# Patient Record
Sex: FEMALE | Race: WHITE
[De-identification: names, ages, dates, MRNs, and addresses within clinical notes are randomized per-mention and may not be internally consistent; named-entity substitution may affect disease eponyms.]

---

## 2017-12-19 ENCOUNTER — HOSPITAL ENCOUNTER (INPATIENT)
Dept: HOSPITAL 92 - ERS | Age: 82
LOS: 2 days | Discharge: HOME | DRG: 69 | End: 2017-12-21
Attending: INTERNAL MEDICINE | Admitting: INTERNAL MEDICINE
Payer: MEDICARE

## 2017-12-19 VITALS — BODY MASS INDEX: 24.6 KG/M2

## 2017-12-19 DIAGNOSIS — Z91.81: ICD-10-CM

## 2017-12-19 DIAGNOSIS — I65.23: ICD-10-CM

## 2017-12-19 DIAGNOSIS — G89.29: ICD-10-CM

## 2017-12-19 DIAGNOSIS — Z79.891: ICD-10-CM

## 2017-12-19 DIAGNOSIS — G51.0: ICD-10-CM

## 2017-12-19 DIAGNOSIS — K21.9: ICD-10-CM

## 2017-12-19 DIAGNOSIS — F32.9: ICD-10-CM

## 2017-12-19 DIAGNOSIS — G45.9: Primary | ICD-10-CM

## 2017-12-19 DIAGNOSIS — I48.91: ICD-10-CM

## 2017-12-19 DIAGNOSIS — I10: ICD-10-CM

## 2017-12-19 DIAGNOSIS — I08.1: ICD-10-CM

## 2017-12-19 LAB
ALP SERPL-CCNC: 65 U/L (ref 40–150)
ALT SERPL W P-5'-P-CCNC: 10 U/L (ref 8–55)
ANION GAP SERPL CALC-SCNC: 11 MMOL/L (ref 10–20)
AST SERPL-CCNC: 15 U/L (ref 5–34)
BASOPHILS # BLD AUTO: 0.1 THOU/UL (ref 0–0.2)
BASOPHILS NFR BLD AUTO: 0.8 % (ref 0–1)
BILIRUB SERPL-MCNC: 0.3 MG/DL (ref 0.2–1.2)
BUN SERPL-MCNC: 6 MG/DL (ref 9.8–20.1)
CALCIUM SERPL-MCNC: 8.7 MG/DL (ref 7.8–10.44)
CHLORIDE SERPL-SCNC: 100 MMOL/L (ref 98–107)
CK SERPL-CCNC: 72 U/L (ref 29–168)
CO2 SERPL-SCNC: 25 MMOL/L (ref 23–31)
CREAT CL PREDICTED SERPL C-G-VRATE: 0 ML/MIN (ref 70–130)
EOSINOPHIL # BLD AUTO: 0.1 THOU/UL (ref 0–0.7)
EOSINOPHIL NFR BLD AUTO: 1.5 % (ref 0–10)
GLOBULIN SER CALC-MCNC: 2.5 G/DL (ref 2.4–3.5)
HCT VFR BLD CALC: 37.5 % (ref 36–47)
HYALINE CASTS #/AREA URNS LPF: (no result) LPF
LYMPHOCYTES # BLD: 2 THOU/UL (ref 1.2–3.4)
LYMPHOCYTES NFR BLD AUTO: 26.7 % (ref 21–51)
MAGNESIUM SERPL-MCNC: 1.8 MG/DL (ref 1.6–2.6)
MONOCYTES # BLD AUTO: 0.5 THOU/UL (ref 0.11–0.59)
MONOCYTES NFR BLD AUTO: 7.4 % (ref 0–10)
NEUTROPHILS # BLD AUTO: 4.7 THOU/UL (ref 1.4–6.5)
RBC # BLD AUTO: 3.96 MILL/UL (ref 4.2–5.4)
RBC UR QL AUTO: (no result) HPF (ref 0–3)
TROPONIN I SERPL DL<=0.01 NG/ML-MCNC: (no result) NG/ML (ref ?–0.03)
WBC # BLD AUTO: 7.3 THOU/UL (ref 4.8–10.8)
WBC UR QL AUTO: (no result) HPF (ref 0–3)

## 2017-12-19 PROCEDURE — 70498 CT ANGIOGRAPHY NECK: CPT

## 2017-12-19 PROCEDURE — 86850 RBC ANTIBODY SCREEN: CPT

## 2017-12-19 PROCEDURE — 84484 ASSAY OF TROPONIN QUANT: CPT

## 2017-12-19 PROCEDURE — 86901 BLOOD TYPING SEROLOGIC RH(D): CPT

## 2017-12-19 PROCEDURE — 70450 CT HEAD/BRAIN W/O DYE: CPT

## 2017-12-19 PROCEDURE — 36415 COLL VENOUS BLD VENIPUNCTURE: CPT

## 2017-12-19 PROCEDURE — 93306 TTE W/DOPPLER COMPLETE: CPT

## 2017-12-19 PROCEDURE — G0009 ADMIN PNEUMOCOCCAL VACCINE: HCPCS

## 2017-12-19 PROCEDURE — 82553 CREATINE MB FRACTION: CPT

## 2017-12-19 PROCEDURE — 80053 COMPREHEN METABOLIC PANEL: CPT

## 2017-12-19 PROCEDURE — 83735 ASSAY OF MAGNESIUM: CPT

## 2017-12-19 PROCEDURE — 70496 CT ANGIOGRAPHY HEAD: CPT

## 2017-12-19 PROCEDURE — 80061 LIPID PANEL: CPT

## 2017-12-19 PROCEDURE — 90682 RIV4 VACC RECOMBINANT DNA IM: CPT

## 2017-12-19 PROCEDURE — A4216 STERILE WATER/SALINE, 10 ML: HCPCS

## 2017-12-19 PROCEDURE — 90732 PPSV23 VACC 2 YRS+ SUBQ/IM: CPT

## 2017-12-19 PROCEDURE — G0008 ADMIN INFLUENZA VIRUS VAC: HCPCS

## 2017-12-19 PROCEDURE — 81015 MICROSCOPIC EXAM OF URINE: CPT

## 2017-12-19 PROCEDURE — 87086 URINE CULTURE/COLONY COUNT: CPT

## 2017-12-19 PROCEDURE — 93880 EXTRACRANIAL BILAT STUDY: CPT

## 2017-12-19 PROCEDURE — 86900 BLOOD TYPING SEROLOGIC ABO: CPT

## 2017-12-19 PROCEDURE — 85025 COMPLETE CBC W/AUTO DIFF WBC: CPT

## 2017-12-19 PROCEDURE — 90471 IMMUNIZATION ADMIN: CPT

## 2017-12-19 PROCEDURE — 71010: CPT

## 2017-12-19 PROCEDURE — 81003 URINALYSIS AUTO W/O SCOPE: CPT

## 2017-12-19 PROCEDURE — 93005 ELECTROCARDIOGRAM TRACING: CPT

## 2017-12-19 PROCEDURE — 80048 BASIC METABOLIC PNL TOTAL CA: CPT

## 2017-12-19 NOTE — RAD
CHEST ONE VIEW

12/19/17

 

HISTORY: 

Near syncopal episode and weakness. Atrial fibrillation. 

 

COMPARISON:  

None.

 

FINDINGS:  

Portable upright chest:

 

Atherosclerosis of the aorta. Calcified AP window lymph node. Normal cardiac silhouette. The pulmonar
y vessels and hilum are normal. Costophrenic angles are clear. No consolidation or masses. Chronic ch
anges are presumed to be throughout the lung parenchyma. Bilateral apical pleural thickening. No pneu
mothorax. Hiatal hernia is noted. Dorsal column stimulator is identified.

 

IMPRESSION:  

No acute cardiopulmonary process.

 

POS: [facility]

## 2017-12-19 NOTE — CT
CT BRAIN

12/19/17

 

PROVIDED CLINICAL HISTORY:  

Syncope. 

 

FINDINGS:  

No comparisons. 

 

The ventricular system appears normal in size and morphology. There is no evidence for intracranial h
emorrhage or mass effect. The extracranial soft tissues and osseous structures demonstrate no acute a
bnormality. 

 

IMPRESSION:  

No evidence for intracranial hemorrhage or mass effect. 

 

POS: [facility]

## 2017-12-20 LAB
ANION GAP SERPL CALC-SCNC: 11 MMOL/L (ref 10–20)
BASOPHILS # BLD AUTO: 0 THOU/UL (ref 0–0.2)
BASOPHILS NFR BLD AUTO: 0.4 % (ref 0–1)
BUN SERPL-MCNC: 5 MG/DL (ref 9.8–20.1)
CALCIUM SERPL-MCNC: 8.8 MG/DL (ref 7.8–10.44)
CHLORIDE SERPL-SCNC: 104 MMOL/L (ref 98–107)
CHOLEST SERPL-MCNC: 210 MG/DL
CO2 SERPL-SCNC: 27 MMOL/L (ref 23–31)
CREAT CL PREDICTED SERPL C-G-VRATE: 79 ML/MIN (ref 70–130)
EOSINOPHIL # BLD AUTO: 0.2 THOU/UL (ref 0–0.7)
EOSINOPHIL NFR BLD AUTO: 2.9 % (ref 0–10)
HCT VFR BLD CALC: 36.1 % (ref 36–47)
LDLC SERPL CALC-MCNC: 141 MG/DL
LYMPHOCYTES # BLD: 1.9 THOU/UL (ref 1.2–3.4)
LYMPHOCYTES NFR BLD AUTO: 30.5 % (ref 21–51)
MONOCYTES # BLD AUTO: 0.5 THOU/UL (ref 0.11–0.59)
MONOCYTES NFR BLD AUTO: 7.9 % (ref 0–10)
NEUTROPHILS # BLD AUTO: 3.7 THOU/UL (ref 1.4–6.5)
RBC # BLD AUTO: 3.86 MILL/UL (ref 4.2–5.4)
WBC # BLD AUTO: 6.4 THOU/UL (ref 4.8–10.8)

## 2017-12-20 RX ADMIN — HYDROCODONE BITARTRATE AND ACETAMINOPHEN PRN TAB: 10; 325 TABLET ORAL at 08:09

## 2017-12-20 RX ADMIN — MORPHINE SULFATE SCH MG: 15 TABLET ORAL at 04:04

## 2017-12-20 RX ADMIN — MORPHINE SULFATE SCH MG: 15 TABLET ORAL at 15:34

## 2017-12-20 RX ADMIN — Medication SCH: at 20:55

## 2017-12-20 RX ADMIN — Medication SCH: at 08:12

## 2017-12-20 RX ADMIN — HYDROCODONE BITARTRATE AND ACETAMINOPHEN PRN TAB: 10; 325 TABLET ORAL at 13:13

## 2017-12-20 RX ADMIN — HYDROCODONE BITARTRATE AND ACETAMINOPHEN PRN TAB: 10; 325 TABLET ORAL at 17:54

## 2017-12-20 NOTE — CT
CT ANGIO OF THE HEAD WITH AND WITHOUT CONTRAST:

 

Technique: Multiple axial tomograms were obtained through the head both pre and post IV contrast. Pos
t contrast images were obtained with dynamic arterial phase enhancement following an angio protocol w
ith multiplanar reconstruction and 3D post processing. 

 

History: Carotid stenosis. 

 

FINDINGS: 

Noncontrast CT scan of head shows no evidence of mass, hemorrhage, or infarct. Mild mucosal thickenin
g in the sphenoid air cells. Paranasal sinuses otherwise clear. 

 

CT angio of the head shows patent intracranial internal carotid arteries. Mild atherosclerotic calcif
ications seen in the carotid portions of both internal carotid arteries, however, no stenosis. Anteri
or cerebral arteries and middle cerebral arteries appear unremarkable. No evidence of stenosis or occ
lusion. Basilar artery is patent. Posterior cerebrals appear unremarkable. Dural venous sinuses appea
r patent. 

 

IMPRESSION: 

Unremarkable CT cerebral angio. 

 

 

CT ANGIO NECK

 

Technique: Multiple axial tomograms were obtained through the neck with arterial phase enhancement fo
llowin angio protocol with multiplanar reconstruction and 3D post processing. 

 

History: Carotid stenosis. 

 

FINDINGS: 

Carotid doppler study 12-20-17 described hemodynamically significant stenosis in the right internal c
arotid artery. 

 

No evidence of stenosis at the origin of the arch vessels. 

 

The right common carotid artery is unremarkable. 

 

There is calcified plaque at the right carotid bulb and bifurcation extending into the proximal right
 internal carotid artery. There is focal stenosis above the bulb which does show evidence of approxim
ately 50% diameter stenosis according to NASCET criteria. The right ICA above this stenosis is unrema
rkable. 

 

Left common carotid is unremarkable although mildly tortuous. 

 

There is calcified plaque in the left bulb and proximal left ICA. This results in mild stenosis of th
e proximal left ICA. The degree of stenosis is estimated in the 30-40% range by NASCET criteria. 

 

Vertebral arteries are patent and unremarkable. 

 

No soft tissue abnormality identified.  

 

IMPRESSION: 

1. Moderate stenosis in the proximal right ICA and mild to moderate stenosis in the proximal left ICA
. The right ICA stenosis is hemodynamically significant as described above. 

 

POS: [facility]

## 2017-12-20 NOTE — CON
DATE OF CONSULTATION:  12/20/2017

 

REASON FOR CONSULTATION:  New onset A. fib and questionable TIA.

 

HISTORY OF PRESENT ILLNESS:  Ms. Mckeon is a pleasant 85-year-old woman with no previous history o
f underlying coronary disease.  She recently presented with syncope.  She has had 2 total episodes in
 the last couple of weeks.  There were no predisposing symptoms.  There was no warning.  No chest shanae
n or pressure.  No shortness of breath.  No other ameliorating or exacerbating factors.  She did have
 left-sided facial weakness and was concerned for CVA versus Bell's palsy.

 

PAST MEDICAL HISTORY:  Chronic back pain and longer takes injections, right foot surgery, hand surger
y, ankle surgery, shoulder surgery, tonsillectomy and hysterectomy.

 

MEDICATIONS:  Include Norco, Robaxin, Reglan, PPI, morphine. 

 

SOCIAL HISTORY:  No current alcohol or tobacco use.

 

FAMILY HISTORY:  Negative for CAD.

 

REVIEW OF SYSTEMS:  Ten point review of systems reviewed and as above, otherwise negative.

 

PHYSICAL EXAMINATION: 

VITAL SIGNS:  Blood pressure 145/70, pulse 82, temperature 98.8.

GENERAL:  Patient is a pleasant male/female who is in no acute distress.  The patient appears his/her
 stated age.

NEUROLOGIC:  The patient is alert and oriented times 3 with no focal neurologic deficits.

HEENT:  Sclerae without icterus.  Mouth has moist mucous membranes with normal pallor. 

NECK:  No JVD.  Carotid upstroke brisk.  No bruits bilaterally. 

LUNGS:  Clear to auscultation with unlabored respirations.

BACK:  No scoliosis or kyphosis.

CARDIAC:  Irregularly irregular with normal S1 and S2.  No S3 or S4 noted.  No significant rubs, murm
urs, thrills, or gallops noted throughout the precordium.  PMI is not displaced.  There is no paraste
rnal heave.

ABDOMEN:  Soft, nontender, nondistended.  No peritoneal signs present.  No hepatosplenomegaly.  No ab
normal striae.

EXTREMITIES:  2+ femoral and 2+ dorsalis pedis pulses.  No cyanosis, clubbing, or edema.

SKIN:  No gross abnormalities.

 

PERTINENT LABORATORY DATA:  Hemoglobin is 11.8, creatinine is 0.62.

 

EKG shows atrial fibrillation with nonspecific ST-T wave changes.

 

IMPRESSION: 

1.  Atrial fibrillation.

2.  ? transient ischemic attack versus Bell's palsy.

 

RECOMMENDATIONS:  Ms. Mckeon does have new onset A. fib.  She has syncopal episode.  She may have 
paroxysmal atrial fibrillation and upon conversion had a bradycardic episode.  At this point, her tel
emetry monitoring has been stable.  Her syncope may have also been due to recent TIA.  At this point,
 given new onset A. fib and CHADS-VASc score greater than 2, would recommend novel oral anticoagulati
on therapy.  I did discuss this in full detail including the risks and benefits.  She has agreed to p
roceed with Eliquis 5 mg 1 p.o. b.i.d.  Echo with Doppler will also be reviewed.  Disposition from ne
urology on TIA versus Bell's palsy or CVA is also warranted.

## 2017-12-20 NOTE — ULT
BILATERAL CAROTID DUPLEX ULTRASOUND:

 

HISTORY: 

TIA.

 

TECHNIQUE: 

Gray scale ultrasound with color flow and spectral Doppler imaging of the extracranial carotid artery
 systems was performed bilaterally.

 

FINDINGS: 

There is plaque formation on either side.

 

The peak systolic velocity in the right ICA measures 155 cm/s with an end-diastolic velocity of 47 cm
/s and a systolic ratio of 1.85.

 

The peak systolic velocity in the left ICA measures 82 cm/s with an end-diastolic velocity of 14 cm/s
 and a systolic ration of 0.95.

 

Flow in both vertebral arteries remains antegrade.

 

IMPRESSION: 

Moderate (50-69%) stenosis involving the right internal carotid artery.

 

POS: [facility]

## 2017-12-20 NOTE — HP
REASON FOR ADMISSION:  Syncope, TIA, new onset atrial fibrillation.

 

HISTORY OF PRESENT ILLNESS:  The patient gives history of standing in kitchen 
on .  She was trying to take her pills, but suddenly got pale and fell 
backwards.  This was witnessed by her .  From then on, she has been 
feeling dizzy and sick.  She also says that she had a similar episode two 
months back.  She also mentions that she has been getting short of breath on 
minimal exertion at home.  No prior cardiac workup.  On arrival here, the 
patient was found to be in new onset atrial fibrillation, but was rate 
controlled.  She normally ambulates with a rolling walker at home.  She has 
chronic pain and follows up with Dr. Sharma.  She has had nearly six back 
surgeries.  The patient seems to twitch her right eye with almost ptosis like, 
but opens it wide and also has flattening of left nasolabial fold.

 

PAST MEDICAL AND SURGICAL HISTORY:  History of chronic back pain with nearly 6 
surgeries done.  She has had a right foot surgery, bilateral hand surgery, both 
ankle surgery, left shoulder surgery, tonsillectomy, hysterectomy, internal 
stimulator for chronic pain.

 

CURRENT MEDICATIONS:  Takes morphine 30 mg p.o. 3 times daily, Norco 10/325 mg 
q.6  hourly p.r.n., Robaxin p.r.n., Reglan p.r.n.  Takes a PPI, which she 
cannot remember for GERD.  The patient goes to [facility].

 

PERSONAL HISTORY:  Does not abuse alcohol or drugs.  No history of smoking.  No 
known drug allergies.

 

FAMILY HISTORY:  Mother  of CHF and its complications in her 80s.  Her 
father also  of CHF in his 80s.  She lives with her  who is had 
recent MI and also has history of Crohn's disease.

 

REVIEW OF SYSTEMS:  The following complete review of systems was negative, 
unless otherwise mentioned in the HPI or below:

Constitutional:  Weight loss or gain, ability to conduct usual activities.

Skin:  Rash, itching.

Eyes:  Double vision, pain.

ENT/Mouth:  Nose bleeding, neck stiffness, pain, tenderness.

Cardiovascular:  Palpitations, dyspnea on exertion, orthopnea.

Respiratory:  Shortness of breath, wheezing, cough, hemoptysis, fever or night 
sweats.

Gastrointestinal:  Poor appetite, abdominal pain, heartburn, nausea, vomiting, 
constipation, or diarrhea.

Genitourinary:  Urgency, frequency, dysuria, nocturia.

Musculoskeletal:  Pain, swelling.

Neurologic/Psychiatric:  Anxiety, depression.

Allergy/Immunologic:  Skin rash, bleeding tendency.

 

PHYSICAL EXAMINATION:

GENERAL:  The patient is an 85-year-old female, who is currently not in any 
acute distress.

VITAL SIGNS:  Blood pressure 146/80, pulse 90 per minute, respiratory rate 16 
per minute, temperature 98.5 degrees Fahrenheit, saturating 95% on room air.

NECK:  Supple, no elevated JVD.

HEENT:  Eyes:  Extraocular muscles are intact.  Pupils are reacting to light.  
Oral cavity:  Mucous membranes are moist.  No exudates or congestion.

CARDIOVASCULAR SYSTEM:  S1, S2 heard.  Irregular rhythm.

RESPIRATORY SYSTEM:  Air entry 1+ bilateral.  No rales or rhonchi.

ABDOMEN:  Soft, bowel sounds heard.  No tenderness, rigidity or guarding.

EXTREMITIES:  No peripheral edema or calf tenderness.

VASCULAR SYSTEM:  Peripheral pulses 1+ bilateral.  No ischemic ulcerations or 
gangrene.

CENTRAL NERVOUS SYSTEM:  No gross focal deficits seen.  The patient is alert, 
awake, oriented x3.

PSYCHIATRIC:  The patient's mood is euthymic.  No hallucinations or delusions.

 

LABORATORY AND X-RAY FINDINGS:  Chest x-ray done shows no acute cardiopulmonary 
process.  CT brain without contrast done showed no intracranial hemorrhage or 
mass effect.  Sodium 132, potassium 4.0, BUN 6, creatinine 0.6, glucose 109.  
Liver enzymes within normal limits.  One set of cardiac enzymes are negative.  
Albumin is 3.5.  White count of 7, H&H are 12 and 37, platelet count is 184, 
MCV is 94 with 63% neutrophils.  EKG done shows atrial fibrillation at 91 beats 
per minute.

 

CLINICAL IMPRESSION AND PLAN:  The patient will be admitted to stroke unit for 
syncope, transient ischemic attack likely Bell's palsy on the left, new onset 
atrial fibrillation and chronic pain syndrome.  We will continue her morphine 
as before to prevent withdrawal.  She will be on full dose of aspirin along 
with a small dose of Lipitor and normal saline at 80 mL per hour.  We will 
obtain orthostatic blood pressures along with MRI and complete stroke workup 
including ultrasound carotids and echo with 2D Doppler to rule out thrombus.  
We will also consult Neurology on call, Dr. Dexter Delaney and Cardiology on call, 
Dr. Mcmahon.  The patient is 85 years old and has had nearly 2 falls in the 
last 2 months and not sure if she would be a candidate for anticoagulation with 
new onset atrial fibrillation.  It is pretty much rate controlled and we will 
place her on a small dose of Lopressor at 25 mg twice daily for now. Please 
note I have seen and examined patient on 2017.

 

MTDD

## 2017-12-20 NOTE — PDOC.PN
- Subjective


Encounter Start Date: 12/20/17


Encounter Start Time: 10:10


-: old records requested/rev





Patient seen and examined. No new complaints. No overnight events





- Objective


Resuscitation Status: 


 











Resuscitation Status           FULL:Full Resuscitation














MAR Reviewed: Yes


Vital Signs & Weight: 


 Vital Signs (12 hours)











  Temp Pulse Resp BP BP BP Pulse Ox


 


 12/20/17 11:58  98.8 F  82  20   145/70 H   94 L


 


 12/20/17 08:00  98.3 F  100  20   153/76 H   94 L


 


 12/20/17 03:14  97.6 F  86  16   133/69   98


 


 12/20/17 02:12     150/82 H  137/70  154/70 H 








 Weight











Weight                         167 lb 4.8 oz














I&O: 


 











 12/19/17 12/20/17 12/21/17





 06:59 06:59 06:59


 


Intake Total  577 


 


Balance  577 











Result Diagrams: 


 12/20/17 05:53





 12/20/17 05:53


Radiology Reviewed by me: Yes (Carotid US, CT brain)


EKG Reviewed by me: Yes (afib)





Phys Exam





- Physical Examination


Constitutional: NAD


HEENT: PERRLA, moist MMs, sclera anicteric


Neck: no JVD, supple


Respiratory: no wheezing, no rales, no rhonchi


Cardiovascular: no significant murmur, irregular


Gastrointestinal: soft, non-tender, no distention, positive bowel sounds


Musculoskeletal: no edema, pulses present


Neurological: non-focal, normal sensation, moves all 4 limbs


Psychiatric: normal affect, A&O x 3


Skin: no rash, normal turgor





Dx/Plan


(1) Carotid stenosis, right


Code(s): I65.21 - OCCLUSION AND STENOSIS OF RIGHT CAROTID ARTERY   Status: 

Acute   





(2) New onset atrial fibrillation


Code(s): I48.91 - UNSPECIFIED ATRIAL FIBRILLATION   Status: Acute   





(3) Syncope


Code(s): R55 - SYNCOPE AND COLLAPSE   Status: Acute   





(4) TIA (transient ischemic attack)


Status: Acute   





(5) Chronic low back pain


Code(s): M54.5 - LOW BACK PAIN; G89.29 - OTHER CHRONIC PAIN   Status: Chronic   





(6) GERD (gastroesophageal reflux disease)


Code(s): K21.9 - GASTRO-ESOPHAGEAL REFLUX DISEASE WITHOUT ESOPHAGITIS   Status: 

Chronic   





- Plan


cont current plan of care, plan discussed w/ family





* pt does not want to go for MRI


* will get CT angio head and neck for carotid stenosis and TIA


* today Echo


* cardiology and neurology consulted


* discussed with pt and  about plan of care


* medication reviewed as below


* symptomatic treatment


* continue IVF


* rate controlled.








Review of Systems





- Review of Systems


ENT: negative: Ear Pain, Ear Discharge, Nose Pain, Nose Discharge, Nose 

Congestion, Mouth Pain, Mouth Swelling, Throat Pain, Throat Swelling, Other


Respiratory: negative: Cough, Dry, Shortness of Breath, Hemoptysis, SOB with 

Excertion, Pleuritic Pain, Sputum, Wheezing


Cardiovascular: negative: chest pain, palpitations, orthopnea, paroxysmal 

nocturnal dyspnea, edema, light headedness, other


Gastrointestinal: negative: Nausea, Vomiting, Abdominal Pain, Diarrhea, 

Constipation, Melena, Hematochezia, Other


Genitourinary: negative: Dysuria, Frequency, Incontinence, Hematuria, Retention

, Other


Musculoskeletal: negative: Neck Pain, Shoulder Pain, Arm Pain, Back Pain, Hand 

Pain, Leg Pain, Foot Pain, Other


Skin: negative: Rash, Lesions, Edilson, Bruising, Other





- Medications/Allergies


Allergies/Adverse Reactions: 


 Allergies











Allergy/AdvReac Type Severity Reaction Status Date / Time


 


No Known Drug Allergies Allergy   Verified 12/19/17 20:30











Medications: 


 Current Medications





Acetaminophen (Tylenol)  650 mg PO Q4H PRN


   PRN Reason: Headache/Fever or Pain


Hydrocodone Bitart/Acetaminophen (Norco 10/325)  1 tab PO Q4H PRN


   PRN Reason: Moderate Pain (4-6)


   Last Admin: 12/20/17 08:09 Dose:  1 tab


Al Hydroxide/Mg Hydroxide (Maalox)  15 ml PO Q4H PRN


   PRN Reason: Heartburn  or Indigestion


Artificial Tears (Tears Naturale)  0 drop EA EYE PRN PRN


   PRN Reason: Dry Eyes


Aspirin (Ecotrin)  325 mg PO DAILY [facility]


   Last Admin: 12/20/17 08:11 Dose:  325 mg


Atorvastatin Calcium (Lipitor)  10 mg PO HS [facility]


Docusate Sodium (Colace)  100 mg PO BID [facility]


   Last Admin: 12/20/17 08:11 Dose:  100 mg


Enoxaparin Sodium (Lovenox)  40 mg SC 0900 [facility]


   Last Admin: 12/20/17 08:12 Dose:  40 mg


Famotidine (Pepcid)  20 mg PO BID [facility]


   Last Admin: 12/20/17 08:11 Dose:  20 mg


Guaifenesin (Robitussin Sf)  200 mg PO Q4H PRN


   PRN Reason: Cough


Hydralazine HCl (Apresoline)  10 mg SLOW IVP Q4H PRN


   PRN Reason: Systolic BP > 180


Sodium Chloride (Normal Saline 0.9%)  1,000 mls @ 80 mls/hr IV .[id number] [facility]


   Last Admin: 12/20/17 01:56 Dose:  1,000 mls


Influenza Virus Vaccine (Fluzone High-Dose 2017-18 Syr)  0.5 ml IM .ONCE ONE


   Stop: 12/21/17 09:01


   Last Admin: 12/20/17 10:31 Dose:  0.5 ml


Loratadine (Claritin)  10 mg PO DAILYPRN PRN


   PRN Reason: Sinus Symptoms


Magnesium Hydroxide (Milk Of Magnesium)  30 ml PO DAILYPRN PRN


   PRN Reason: Constipation


Metoclopramide HCl (Reglan)  10 mg PO TID PRN


   PRN Reason: nausea


   Last Admin: 12/20/17 10:31 Dose:  10 mg


Metoprolol Tartrate (Lopressor)  25 mg PO BID [facility]


   Last Admin: 12/20/17 08:11 Dose:  25 mg


Mineral Oil/White Petrolatum (Eucerin Cream)  0 gm TOP BIDPRN PRN


   PRN Reason: Dry Skin


Ondansetron HCl (Zofran)  4 mg IVP Q6H PRN


   PRN Reason: Nausea/Vomiting


Phenol (Chloraseptic Spray 180 Ml Bot)  0 ml PO PRN PRN


   PRN Reason: Sore Throat


Pneumococcal Polyvalent Vaccine (Pneumovax 23)  0.5 ml IM .ONCE ONE


   Stop: 12/21/17 09:01


   Last Admin: 12/20/17 10:34 Dose:  0.5 ml


Sodium Chloride (Flush - Normal Saline)  10 ml IVF Q12HR [facility]


   Last Admin: 12/20/17 08:12 Dose:  Not Given


Sodium Chloride (Flush - Normal Saline)  10 ml IVF PRN PRN


   PRN Reason: Saline Flush


Sodium Chloride (Ocean Nasal Spray 0.65%)  0 ml EA NARE QIDPRN PRN


   PRN Reason: Nasal Congestion


Temazepam (Restoril)  15 mg PO HSPRN PRN


   PRN Reason: Insomnia

## 2017-12-20 NOTE — CON
DATE OF CONSULTATION:  12/20/2017

 

REFERRING PROVIDER:  Peggy Bauer M.D.

 

REASON FOR CONSULTATION:  Dizziness.

 

HISTORY OF PRESENT ILLNESS:  Ms. Mckeon is a pleasant 85-year-old  female who has been co
nsidered for evaluation of dizziness.  The patient reports that she was in the kitchen and was taking
 her medication after she took the medication she suddenly became very pale and passed out and fell t
o the ground.  Her  witnessed the event who noticed that she was very pale and was unresponsiv
e for less than one minute, there were no convulsions noted.  No tongue biting or loss of bladder con
trol noted.  She had no postictal confusion.  She reports that she had similar episode couple months 
ago while she was in bathroom, during that time as well she had became pale and passed out.  She stat
es that she has been having dizziness when she stands up out of the chair and feels like that she was
 going to pass out.  She denies any headache, chest pain, palpitation, vision changes, numbness, ting
ling or weakness.  She denies any prior history of seizure disorder.

 

PAST MEDICAL HISTORY:  Significant for hypertension, new onset atrial fibrillation, chronic back pain
.

 

PAST SURGICAL HISTORY:  Significant for lumbar spine surgery x6, right foot surgery, bilateral hand s
urgery, bilateral ankle surgery, left shoulder surgery, tonsillectomy, hysterectomy, pain stimulator 
placement.

 

CURRENT MEDICATIONS:  Please review MAR.

 

ALLERGIES:  No known drug allergies.

 

FAMILY HISTORY:  Noncontributory.

 

SOCIAL HISTORY:  She denies smoking, alcohol use, or illicit drug use.

 

REVIEW OF SYSTEMS:  As mentioned in the HPI, otherwise negative.

 

PHYSICAL EXAMINATION:

VITAL SIGNS:  Blood pressure of 142/67, pulse of 88, temperature of 98.3, respirations of 18, O2 sats
 of 93% on room air.

GENERAL:  Well-developed, well-nourished  female in no apparent distress.

RESPIRATORY:  Clear to auscultation bilaterally.

CARDIOVASCULAR:  Irregular rate and rhythm.

NEUROLOGIC:  Mental status:  The patient is awake, alert, oriented x3.  Speech and language:  Fluent 
speech.  Cranial nerves:  Pupils are 3 mm and reactive.  Visual fields are intact.  Extraocular muscl
es are intact.  No nystagmus is noted.  Face is symmetric.  Tongue and uvula are midline.  Motor exam
 showed normal tone and bulk with 5/5 strength in both upper and lower extremities.  Sensory:  Sensat
ion is intact and symmetric.  Deep tendon reflexes 2+ reflexes in both upper and lower extremities.  
Babinski:  Plantar responses flexion bilaterally.  Coordination intact to finger-nose-finger tapping 
bilaterally.

 

LABORATORY DATA:  Reviewed, which included CBC, CMP, lipid profile, and urinalysis, which is signific
ant for hemoglobin of 11.8 and hematocrit of 36.1.  Sodium of 132, total cholesterol of 210, LDL of 1
41, HDL of 56, and triglycerides of 63.  Urinalysis showed 7-10 wbc and small leukocyte esterase.

 

IMAGING STUDIES:  CT scan of the head without contrast was reviewed, which showed no acute intracrani
al abnormality.  CT angiogram of the head and neck were reviewed, which showed 50% stenosis involving
 the right ICA.

 

IMPRESSION:

1.  Syncope.

2.  New onset atrial fibrillation.

3.  Right internal carotid artery stenosis.

 

PLAN:  Ms. Mckeon is a pleasant 85-year-old  female who presented with the episode of syn
cope.  She is found to have new onset atrial fibrillation, recent event is likely secondary to underl
luke cardiac cause.  She had a CT angiogram of the neck done, which showed moderate hemodynamically s
ignificant stenosis that on the right ICA, which is at 50%, this is best managed medically.  At this 
time, I will recommend continuing her on aspirin 325 mg daily for secondary stroke prevention.

 

Thank you for the consultation.

## 2017-12-21 VITALS — TEMPERATURE: 98.1 F

## 2017-12-21 VITALS — DIASTOLIC BLOOD PRESSURE: 74 MMHG | SYSTOLIC BLOOD PRESSURE: 159 MMHG

## 2017-12-21 RX ADMIN — Medication SCH ML: at 08:15

## 2017-12-21 NOTE — DIS
DATE OF ADMISSION:  12/19/2017

 

DATE OF DISCHARGE:  12/21/2017

 

PRIMARY CARE PHYSICIAN:  Dr. Lily Quispe.

 

DISCHARGE DISPOSITION:  Home.

 

PRIMARY DISCHARGE DIAGNOSES:

1.  Syncope.

2.  Transient ischemic attack.

3.  New onset atrial fibrillation.

4.  Moderate right carotid stenosis.

5.  Moderate mitral and tricuspid regurgitation.

 

SECONDARY DISCHARGE DIAGNOSES:  Gastroesophageal reflux disease and chronic low 
back pain.

 

PRIMARY PROCEDURE/OPERATION:  None.

 

RADIOLOGICAL INVESTIGATION:  CT brain on admission showed no acute intracranial 
process.  Chest x-ray showed no acute cardiopulmonary process.  
Echocardiography showed moderate mitral and tricuspid regurgitation.  Carotid 
Doppler showed right-sided carotid stenosis.  CT angiography, head and neck, 
consistent with moderate stenosis of right internal carotid artery as well as 
mild stenosis on the left internal carotid artery.

 

SIGNIFICANT LABORATORY DATA:  WBC 6.4, hemoglobin 11.8, platelets 181.  Sodium 
138, potassium 3.9, BUN 5, creatinine 0.62, calcium 8.8.  LFTs normal.  Cardiac 
enzymes negative.  .

 

DISCHARGE MEDICATIONS:  Eliquis 5 mg p.o. b.i.d., aspirin 81 mg p.o. daily, 
Lipitor 10 mg p.o. at bedtime, Norco one or two tablets q.4 hourly p.r.n., 
Reglan 10 mg t.i.d. p.r.n., metoprolol 50 mg p.o. b.i.d., MS Contin 30 mg p.o. 
q.8 hourly, omeprazole 20 mg p.o. daily.

 

CONTRAINDICATIONS:  None.

 

CODE STATUS:  FULL CODE.

 

INPATIENT CONSULTANTS:  Dr. Elaina Renteria was consulted and he recommended to 
treat carotid stenosis medically.  Dr. Mcmahon was consulted, who recommended 
to start anticoagulation and outpatient followup.

 

TEST RESULTS PENDING ON DISCHARGE:  None.

 

ALLERGIES:  No known drug allergy.

 

DISCHARGE PLAN:  Post hospital, the patient will follow up with primary care 
physician, Dr. Mcmahon, and neurologist as instructed.

 

HOSPITAL COURSE:  An 85-year-old female, who was admitted by Dr. Bauer.  
Please see his H and P for further details.  The patient had an episode of 
syncope as well as TIA and that is why she was admitted on the stroke floor.  
Initial CT brain was negative.  Chest x-ray was normal.  After admission, we 
did a neuro check and stroke workup.  Echocardiography showed moderate mitral 
and tricuspid regurgitation.

 

This patient also had new onset atrial fibrillation and her rate was under 
control with metoprolol.  Cardiology was consulted and they recommended 
anticoagulation.  We started and Eliquis therapy.  We discussed the risk and 
benefit of Eliquis therapy and patient agreed to continue on Eliquis therapy.

 

As a part of stroke workup, we did carotid Doppler study, which showed carotid 
stenosis and subsequently we found that the CT angiography also showed moderate 
stenosis on the right and mild-to-moderate stenosis on the left.  Neurology 
recommended to continue medical therapy for her carotid stenosis.

 

Patient medically remained stable.  All consultants cleared her for discharge.

 

PHYSICAL EXAMINATION:  The patient is seen and examined at bedside today.

VITAL SIGNS:  Currently, temperature 98.1, pulse 87 and irregular, respiratory 
rate 20, saturation 92%, blood pressure 159/74, weight 167 pounds.

GENERAL:  The patient is currently alert, awake, in no acute distress.

HEAD:  Normocephalic, atraumatic.

LUNGS:  Clear to auscultation without any rhonchi.

CARDIAC:  S1, S2 irregular, soft systolic murmur noted, no gallop, no rub.

ABDOMEN:  Soft and benign without any tenderness.

EXTREMITIES:  No edema.

NEUROLOGIC:  Nonfocal examination.

 

While in hospital, physical therapy recommended SNF referral, but when I spoke 
with the patient today, she did not wanted to go to skilled nursing home rather 
she wanted to go home.  At this point, the patient is medically stable for 
discharge.  While in hospital, her urine culture also came back negative.

 

All new medication prescriptions sent to her pharmacy.



Total time spent on discharge day more than 30 minutes

 

MTDD

## 2017-12-21 NOTE — PRG
DATE OF SERVICE:  12/21/2017

 

Ms. Mckeon is doing well, no current complaints of chest pain, pressure, dizziness, lightheadednes
s, or syncope.

 

PHYSICAL EXAMINATION:

VITAL SIGNS:  Blood pressure 125/75, pulse 75, temperature 98.1.

LUNGS:  Clear to auscultation.

CARDIAC:  Irregularly irregular.  

ABDOMEN:  Soft, nontender, nondistended.

EXTREMITIES:  No edema.

 

IMPRESSION:

1.  Atrial fibrillation.

2.?  Transient ischemic attack.  

 

RECOMMENDATIONS:  I have added Eliquis 5 mg 1 p.o. t.i.d.  I have also discontinue Enoxaparin.  Would
 continue metoprolol 25 mg 1 p.o. b.i.d.  Her rate appears to be well controlled.

 

Plan is to follow up with Ms. Mckeon in the next 1-2 weeks.

## 2017-12-21 NOTE — PDOC.PN
- Subjective


Encounter Start Date: 12/21/17


Encounter Start Time: 07:05





Patient seen and examined. No new complaints. No overnight events





- Objective


Resuscitation Status: 


 











Resuscitation Status           FULL:Full Resuscitation














MAR Reviewed: Yes


Vital Signs & Weight: 


 Vital Signs (12 hours)











  Temp Pulse Resp BP BP Pulse Ox


 


 12/21/17 07:48  98.1 F  87  20  159/74 H   92 L


 


 12/21/17 03:43  98.1 F  79  20   125/75  96








 Weight











Weight                         167 lb 4.8 oz














I&O: 


 











 12/20/17 12/21/17 12/22/17





 06:59 06:59 06:59


 


Intake Total 577 1653 


 


Balance 577 1653 











Result Diagrams: 


 12/20/17 05:53





 12/20/17 05:53


EKG Reviewed by me: Yes (afib)





Phys Exam





- Physical Examination


Constitutional: NAD


HEENT: moist MMs, sclera anicteric


Neck: no JVD, supple


Respiratory: no wheezing, no rales, no rhonchi


Cardiovascular: no significant murmur, irregular


Gastrointestinal: soft, non-tender, no distention, positive bowel sounds


Musculoskeletal: no edema, pulses present


Neurological: non-focal, normal sensation, moves all 4 limbs


Psychiatric: normal affect, A&O x 3


Skin: no rash, normal turgor





Dx/Plan


(1) Carotid stenosis, right


Code(s): I65.21 - OCCLUSION AND STENOSIS OF RIGHT CAROTID ARTERY   Status: 

Acute   





(2) New onset atrial fibrillation


Code(s): I48.91 - UNSPECIFIED ATRIAL FIBRILLATION   Status: Acute   





(3) Syncope


Code(s): R55 - SYNCOPE AND COLLAPSE   Status: Acute   





(4) TIA (transient ischemic attack)


Status: Acute   





(5) Chronic low back pain


Code(s): M54.5 - LOW BACK PAIN; G89.29 - OTHER CHRONIC PAIN   Status: Chronic   





(6) GERD (gastroesophageal reflux disease)


Code(s): K21.9 - GASTRO-ESOPHAGEAL REFLUX DISEASE WITHOUT ESOPHAGITIS   Status: 

Chronic   





- Plan


cont current plan of care





* medication reviewed as below


* symptomatic treatment


* see discharge summery


* medically stable for discharge..








Review of Systems





- Review of Systems


ENT: negative: Ear Pain, Ear Discharge, Nose Pain, Nose Discharge, Nose 

Congestion, Mouth Pain, Mouth Swelling, Throat Pain, Throat Swelling, Other


Respiratory: negative: Cough, Dry, Shortness of Breath, Hemoptysis, SOB with 

Excertion, Pleuritic Pain, Sputum, Wheezing


Cardiovascular: negative: chest pain, palpitations, orthopnea, paroxysmal 

nocturnal dyspnea, edema, light headedness, other


Gastrointestinal: negative: Nausea, Vomiting, Abdominal Pain, Diarrhea, 

Constipation, Melena, Hematochezia, Other


Genitourinary: negative: Dysuria, Frequency, Incontinence, Hematuria, Retention

, Other


Musculoskeletal: negative: Neck Pain, Shoulder Pain, Arm Pain, Back Pain, Hand 

Pain, Leg Pain, Foot Pain, Other





- Medications/Allergies


Allergies/Adverse Reactions: 


 Allergies











Allergy/AdvReac Type Severity Reaction Status Date / Time


 


No Known Drug Allergies Allergy   Verified 12/19/17 20:30

## 2018-01-03 ENCOUNTER — HOSPITAL ENCOUNTER (EMERGENCY)
Dept: HOSPITAL 92 - ERS | Age: 83
Discharge: HOME | End: 2018-01-03
Payer: MEDICARE

## 2018-01-03 DIAGNOSIS — Z79.899: ICD-10-CM

## 2018-01-03 DIAGNOSIS — R53.1: Primary | ICD-10-CM

## 2018-01-03 DIAGNOSIS — Z79.82: ICD-10-CM

## 2018-01-03 DIAGNOSIS — F32.9: ICD-10-CM

## 2018-01-03 LAB
ALBUMIN SERPL BCG-MCNC: 3.6 G/DL (ref 3.4–4.8)
ALP SERPL-CCNC: 107 U/L (ref 40–150)
ALT SERPL W P-5'-P-CCNC: 7 U/L (ref 8–55)
ANION GAP SERPL CALC-SCNC: 15 MMOL/L (ref 10–20)
APTT PPP: 34.8 SEC (ref 22.9–36.1)
AST SERPL-CCNC: 14 U/L (ref 5–34)
BASOPHILS # BLD AUTO: 0 THOU/UL (ref 0–0.2)
BASOPHILS NFR BLD AUTO: 0.6 % (ref 0–1)
BILIRUB SERPL-MCNC: 0.4 MG/DL (ref 0.2–1.2)
BUN SERPL-MCNC: 11 MG/DL (ref 9.8–20.1)
CALCIUM SERPL-MCNC: 8.8 MG/DL (ref 7.8–10.44)
CHLORIDE SERPL-SCNC: 105 MMOL/L (ref 98–107)
CK MB SERPL-MCNC: 0.8 NG/ML (ref 0–6.6)
CO2 SERPL-SCNC: 21 MMOL/L (ref 23–31)
CREAT CL PREDICTED SERPL C-G-VRATE: 0 ML/MIN (ref 70–130)
EOSINOPHIL # BLD AUTO: 0.1 THOU/UL (ref 0–0.7)
EOSINOPHIL NFR BLD AUTO: 1 % (ref 0–10)
GLOBULIN SER CALC-MCNC: 2.3 G/DL (ref 2.4–3.5)
GLUCOSE SERPL-MCNC: 110 MG/DL (ref 83–110)
HGB BLD-MCNC: 12.7 G/DL (ref 12–16)
INR PPP: 1.4
LYMPHOCYTES # BLD: 1.8 THOU/UL (ref 1.2–3.4)
LYMPHOCYTES NFR BLD AUTO: 21.4 % (ref 21–51)
MCH RBC QN AUTO: 30.3 PG (ref 27–31)
MCV RBC AUTO: 96.2 FL (ref 81–99)
MONOCYTES # BLD AUTO: 0.5 THOU/UL (ref 0.11–0.59)
MONOCYTES NFR BLD AUTO: 6 % (ref 0–10)
NEUTROPHILS # BLD AUTO: 6 THOU/UL (ref 1.4–6.5)
NEUTROPHILS NFR BLD AUTO: 71.1 % (ref 42–75)
PLATELET # BLD AUTO: 207 THOU/UL (ref 130–400)
POTASSIUM SERPL-SCNC: 4.6 MMOL/L (ref 3.5–5.1)
PROTHROMBIN TIME: 17 SEC (ref 12–14.7)
RBC # BLD AUTO: 4.19 MILL/UL (ref 4.2–5.4)
SODIUM SERPL-SCNC: 136 MMOL/L (ref 136–145)
SP GR UR STRIP: 1.01 (ref 1–1.04)
TROPONIN I SERPL DL<=0.01 NG/ML-MCNC: (no result) NG/ML (ref ?–0.03)
WBC # BLD AUTO: 8.4 THOU/UL (ref 4.8–10.8)

## 2018-01-03 PROCEDURE — 93005 ELECTROCARDIOGRAM TRACING: CPT

## 2018-01-03 PROCEDURE — 36415 COLL VENOUS BLD VENIPUNCTURE: CPT

## 2018-01-03 PROCEDURE — 71045 X-RAY EXAM CHEST 1 VIEW: CPT

## 2018-01-03 PROCEDURE — 84484 ASSAY OF TROPONIN QUANT: CPT

## 2018-01-03 PROCEDURE — 70450 CT HEAD/BRAIN W/O DYE: CPT

## 2018-01-03 PROCEDURE — 87804 INFLUENZA ASSAY W/OPTIC: CPT

## 2018-01-03 PROCEDURE — 85025 COMPLETE CBC W/AUTO DIFF WBC: CPT

## 2018-01-03 PROCEDURE — 85730 THROMBOPLASTIN TIME PARTIAL: CPT

## 2018-01-03 PROCEDURE — 85610 PROTHROMBIN TIME: CPT

## 2018-01-03 PROCEDURE — 82553 CREATINE MB FRACTION: CPT

## 2018-01-03 PROCEDURE — 80053 COMPREHEN METABOLIC PANEL: CPT

## 2018-01-03 PROCEDURE — 81003 URINALYSIS AUTO W/O SCOPE: CPT

## 2018-01-03 NOTE — RAD
PORTABLE CHEST ONE VIEW:

 

Date: 1-3-18 

Time: 2:55 p.m.

 

History: Dyspnea. Dizziness. Weakness. 

 

FINDINGS: 

Comparison is made with exam of 12-19-17. 

 

The heart size is borderline. The aorta is tortuous. There is evidence of old granulomatous disease. 
The lungs are expanded without confluent areas of consolidation, pneumothorax, bhumika pulmonary edema 
or pleural effusions. Dorsal column stimulator leads are again seen. A hiatal hernia is present. 

 

IMPRESSION: 

Stable exam. No acute process. 

 

POS: EVARISTO

## 2018-01-27 NOTE — EKG
Test Reason : DIZZINESS WEAKNESS

Blood Pressure : ***/*** mmHG

Vent. Rate : 073 BPM     Atrial Rate : 357 BPM

   P-R Int : 000 ms          QRS Dur : 074 ms

    QT Int : 376 ms       P-R-T Axes : 000 015 041 degrees

   QTc Int : 414 ms

 

Atrial fibrillation

Abnormal ECG

 

Confirmed by GATITO JUÁREZ ([id number]),  RUTH CARVALHO ([id number]) on 1/27/2018 9:49:51 PM

 

Referred By:             Confirmed By:GATITO JUÁREZ

## 2018-03-15 ENCOUNTER — HOSPITAL ENCOUNTER (EMERGENCY)
Dept: HOSPITAL 92 - ERS | Age: 83
Discharge: HOME | End: 2018-03-15
Payer: MEDICARE

## 2018-03-15 DIAGNOSIS — R06.00: Primary | ICD-10-CM

## 2018-03-15 DIAGNOSIS — F32.9: ICD-10-CM

## 2018-03-15 DIAGNOSIS — Z79.01: ICD-10-CM

## 2018-03-15 DIAGNOSIS — Z79.899: ICD-10-CM

## 2018-03-15 DIAGNOSIS — Z79.82: ICD-10-CM

## 2018-03-15 LAB
ALBUMIN SERPL BCG-MCNC: 4.1 G/DL (ref 3.4–4.8)
ALP SERPL-CCNC: 73 U/L (ref 40–150)
ALT SERPL W P-5'-P-CCNC: (no result) U/L (ref 8–55)
ANALYZER IN CARDIO: (no result)
ANION GAP SERPL CALC-SCNC: 16 MMOL/L (ref 10–20)
AST SERPL-CCNC: 16 U/L (ref 5–34)
BASE EXCESS STD BLDA CALC-SCNC: -1.6 MEQ/L
BASOPHILS # BLD AUTO: 0.1 THOU/UL (ref 0–0.2)
BASOPHILS NFR BLD AUTO: 0.9 % (ref 0–1)
BILIRUB SERPL-MCNC: 0.5 MG/DL (ref 0.2–1.2)
BUN SERPL-MCNC: 11 MG/DL (ref 9.8–20.1)
CA-I BLDA-SCNC: 1.2 MMOL/L (ref 1.12–1.3)
CALCIUM SERPL-MCNC: 9.5 MG/DL (ref 7.8–10.44)
CHLORIDE SERPL-SCNC: 104 MMOL/L (ref 98–107)
CK MB SERPL-MCNC: 1.2 NG/ML (ref 0–6.6)
CK SERPL-CCNC: 31 U/L (ref 29–168)
CO2 SERPL-SCNC: 21 MMOL/L (ref 23–31)
CREAT CL PREDICTED SERPL C-G-VRATE: 0 ML/MIN (ref 70–130)
CRYSTAL-AUWI FLAG: 0 (ref 0–15)
EOSINOPHIL # BLD AUTO: 0 THOU/UL (ref 0–0.7)
EOSINOPHIL NFR BLD AUTO: 0.6 % (ref 0–10)
GLOBULIN SER CALC-MCNC: 2.6 G/DL (ref 2.4–3.5)
GLUCOSE SERPL-MCNC: 125 MG/DL (ref 83–110)
HCO3 BLDA-SCNC: 21.1 MEQ/L (ref 22–26)
HCT VFR BLDA CALC: 39.2 % (ref 36–47)
HEV IGM SER QL: 0.5 (ref 0–7.99)
HGB BLD-MCNC: 13.3 G/DL (ref 12–16)
HGB BLDA-MCNC: 12.6 G/DL (ref 12–16)
HYALINE CASTS #/AREA URNS LPF: (no result) LPF
LYMPHOCYTES # BLD: 1.6 THOU/UL (ref 1.2–3.4)
LYMPHOCYTES NFR BLD AUTO: 19.2 % (ref 21–51)
MCH RBC QN AUTO: 30.3 PG (ref 27–31)
MCV RBC AUTO: 92.4 FL (ref 81–99)
MONOCYTES # BLD AUTO: 0.5 THOU/UL (ref 0.11–0.59)
MONOCYTES NFR BLD AUTO: 5.8 % (ref 0–10)
NEUTROPHILS # BLD AUTO: 6 THOU/UL (ref 1.4–6.5)
NEUTROPHILS NFR BLD AUTO: 73.5 % (ref 42–75)
PATHC CAST-AUWI FLAG: 0.4 (ref 0–2.49)
PCO2 BLDA: 29.8 MMHG (ref 35–45)
PH BLDA: 7.47 [PH] (ref 7.35–7.45)
PLATELET # BLD AUTO: 231 THOU/UL (ref 130–400)
PO2 BLDA: 78.8 MMHG (ref 80–100)
POTASSIUM SERPL-SCNC: 4.6 MMOL/L (ref 3.5–5.1)
RBC # BLD AUTO: 4.39 MILL/UL (ref 4.2–5.4)
RBC UR QL AUTO: (no result) HPF (ref 0–3)
SODIUM SERPL-SCNC: 136 MMOL/L (ref 136–145)
SP GR UR STRIP: 1.01 (ref 1–1.04)
SPECIMEN DRAWN FROM PATIENT: (no result)
SPERM-AUWI FLAG: 0 (ref 0–9.9)
TROPONIN I SERPL DL<=0.01 NG/ML-MCNC: (no result) NG/ML (ref ?–0.03)
WBC # BLD AUTO: 8.2 THOU/UL (ref 4.8–10.8)
WBC UR QL AUTO: (no result) HPF (ref 0–3)
YEAST-AUWI FLAG: 0 (ref 0–25)

## 2018-03-15 PROCEDURE — 84484 ASSAY OF TROPONIN QUANT: CPT

## 2018-03-15 PROCEDURE — 85025 COMPLETE CBC W/AUTO DIFF WBC: CPT

## 2018-03-15 PROCEDURE — 71045 X-RAY EXAM CHEST 1 VIEW: CPT

## 2018-03-15 PROCEDURE — 81003 URINALYSIS AUTO W/O SCOPE: CPT

## 2018-03-15 PROCEDURE — 81015 MICROSCOPIC EXAM OF URINE: CPT

## 2018-03-15 PROCEDURE — 83880 ASSAY OF NATRIURETIC PEPTIDE: CPT

## 2018-03-15 PROCEDURE — 93005 ELECTROCARDIOGRAM TRACING: CPT

## 2018-03-15 PROCEDURE — 80053 COMPREHEN METABOLIC PANEL: CPT

## 2018-03-15 PROCEDURE — 82553 CREATINE MB FRACTION: CPT

## 2018-03-15 PROCEDURE — 82805 BLOOD GASES W/O2 SATURATION: CPT

## 2018-03-15 PROCEDURE — 94760 N-INVAS EAR/PLS OXIMETRY 1: CPT

## 2018-03-15 PROCEDURE — 96374 THER/PROPH/DIAG INJ IV PUSH: CPT

## 2018-03-15 NOTE — RAD
UPRIGHT PORTABLE CHEST ONE VIEW

3/15/18

 

HISTORY: 

85-year-old female with history of dyspnea and shortness of breath.

 

COMPARISON:  

1/3/18.

 

Atherosclerosis and old granulomatous disease. Dorsal column stimulator leads overlie the lower thora
cic spine. Small hiatal hernia. Minimal biapical pleural thickening. No confluent pneumonia, pleural 
effusion or other acute process. 

 

IMPRESSION:  

Old granulomatous disease. Minimal stable chronic changes. Hiatal hernia. No significant new process.
 

 

POS: KATHIE

## 2018-04-07 NOTE — EKG
Test Reason : SHORTNESS OF BREATH

Blood Pressure : ***/*** mmHG

Vent. Rate : 102 BPM     Atrial Rate : 044 BPM

   P-R Int : 000 ms          QRS Dur : 064 ms

    QT Int : 356 ms       P-R-T Axes : 000 001 -50 degrees

   QTc Int : 463 ms

 

Atrial fibrillation with rapid ventricular response

Septal infarct , age undetermined

Abnormal ECG

 

Confirmed by JENNA RASMUSSEN ([id number]),  RUTH CARVALHO ([id number]) on 4/7/2018 8:41:06 PM

 

Referred By:             Confirmed By:JENNA RASMUSSEN

## 2018-04-11 ENCOUNTER — HOSPITAL ENCOUNTER (OUTPATIENT)
Dept: HOSPITAL 92 - CT | Age: 83
Discharge: HOME | End: 2018-04-11
Attending: INTERNAL MEDICINE
Payer: MEDICARE

## 2018-04-11 DIAGNOSIS — N28.1: ICD-10-CM

## 2018-04-11 DIAGNOSIS — I26.99: Primary | ICD-10-CM

## 2018-04-11 DIAGNOSIS — K44.9: ICD-10-CM

## 2018-04-11 DIAGNOSIS — N28.89: ICD-10-CM

## 2018-04-11 LAB — ESTIMATED GFR-MDRD - POC: (no result)

## 2018-04-11 PROCEDURE — 71275 CT ANGIOGRAPHY CHEST: CPT

## 2018-04-11 PROCEDURE — 82565 ASSAY OF CREATININE: CPT

## 2018-04-11 NOTE — CT
CTA CHEST WITH CONTRAST:

 

Date:  04/11/18 

 

COMPARISON: 

CT abdomen/pelvis 6/4/10.

 

HISTORY:  

Shortness of breath for weeks and atrial fibrillation. 

 

TECHNIQUE:  

Multiple contiguous axial images were obtained in a CTA of the chest with contrast per pulmonary embo
lism protocol. 3D oblique MIP reformats and direct coronal reformats were performed. 

 

FINDINGS:

The pulmonary arteries are well opacified without filling defects to suggest pulmonary emboli. The he
art is normal in size without focal cardiac abnormality. No hilar or mediastinal lymphadenopathy seen
. There are calcified hilar and mediastinal lymph nodes. There is a  moderate hiatal hernia.

 

A calcified granuloma is seen in the lingula. No suspicious pulmonary nodule is seen. No pneumothorax
 or pleural effusions are seen. 

 

There is a 5.5 cm cyst in the left kidney. There are calcifications in the left kidney which were not
 seen on the prior CT and may represent either vascular calcifications or nonobstructing renal calcif
ications measuring up to 4 mm in size.  The other visualized subdiaphragmatic structures are unremark
able. The chest wall soft tissues are unremarkable. 

 

IMPRESSION:  

1.      No evidence of pulmonary thromboembolism.

2.      Hiatal hernia.

3.      Left renal cyst. 

4.      Calcifications in the left kidney may represent vascular calcifications or nonobstructing lef
t renal calcifications.

 

 

POS: EVARISTO

## 2018-08-30 ENCOUNTER — HOSPITAL ENCOUNTER (OUTPATIENT)
Dept: HOSPITAL 92 - ERS | Age: 83
Setting detail: OBSERVATION
LOS: 1 days | Discharge: HOME | End: 2018-08-31
Attending: INTERNAL MEDICINE | Admitting: INTERNAL MEDICINE
Payer: MEDICARE

## 2018-08-30 VITALS — BODY MASS INDEX: 23.5 KG/M2

## 2018-08-30 DIAGNOSIS — G89.29: ICD-10-CM

## 2018-08-30 DIAGNOSIS — M54.5: ICD-10-CM

## 2018-08-30 DIAGNOSIS — I34.0: ICD-10-CM

## 2018-08-30 DIAGNOSIS — Z79.82: ICD-10-CM

## 2018-08-30 DIAGNOSIS — F41.8: ICD-10-CM

## 2018-08-30 DIAGNOSIS — E87.1: ICD-10-CM

## 2018-08-30 DIAGNOSIS — I65.21: ICD-10-CM

## 2018-08-30 DIAGNOSIS — I48.2: ICD-10-CM

## 2018-08-30 DIAGNOSIS — I07.1: ICD-10-CM

## 2018-08-30 DIAGNOSIS — R07.89: Primary | ICD-10-CM

## 2018-08-30 DIAGNOSIS — E87.5: ICD-10-CM

## 2018-08-30 DIAGNOSIS — Z79.891: ICD-10-CM

## 2018-08-30 DIAGNOSIS — Z79.899: ICD-10-CM

## 2018-08-30 DIAGNOSIS — K21.9: ICD-10-CM

## 2018-08-30 DIAGNOSIS — Z79.01: ICD-10-CM

## 2018-08-30 LAB
ALBUMIN SERPL BCG-MCNC: 3.5 G/DL (ref 3.4–4.8)
ALP SERPL-CCNC: 95 U/L (ref 40–150)
ALT SERPL W P-5'-P-CCNC: 7 U/L (ref 8–55)
ANION GAP SERPL CALC-SCNC: 13 MMOL/L (ref 10–20)
AST SERPL-CCNC: 23 U/L (ref 5–34)
BASOPHILS # BLD AUTO: 0 THOU/UL (ref 0–0.2)
BASOPHILS NFR BLD AUTO: 0.5 % (ref 0–1)
BILIRUB SERPL-MCNC: 0.6 MG/DL (ref 0.2–1.2)
BUN SERPL-MCNC: 8 MG/DL (ref 9.8–20.1)
CALCIUM SERPL-MCNC: 8.7 MG/DL (ref 7.8–10.44)
CHLORIDE SERPL-SCNC: 103 MMOL/L (ref 98–107)
CK MB SERPL-MCNC: 2 NG/ML (ref 0–6.6)
CO2 SERPL-SCNC: 19 MMOL/L (ref 23–31)
CREAT CL PREDICTED SERPL C-G-VRATE: 0 ML/MIN (ref 70–130)
CRYSTAL-AUWI FLAG: 0 (ref 0–15)
EOSINOPHIL # BLD AUTO: 0.1 THOU/UL (ref 0–0.7)
EOSINOPHIL NFR BLD AUTO: 0.6 % (ref 0–10)
GLOBULIN SER CALC-MCNC: 3.4 G/DL (ref 2.4–3.5)
GLUCOSE SERPL-MCNC: 120 MG/DL (ref 83–110)
HEV IGM SER QL: 3.3 (ref 0–7.99)
HGB BLD-MCNC: 13.3 G/DL (ref 12–16)
HYALINE CASTS #/AREA URNS LPF: (no result) LPF
LIPASE SERPL-CCNC: (no result) U/L (ref 8–78)
LYMPHOCYTES # BLD: 1.9 THOU/UL (ref 1.2–3.4)
LYMPHOCYTES NFR BLD AUTO: 22.2 % (ref 21–51)
MCH RBC QN AUTO: 29.1 PG (ref 27–31)
MCV RBC AUTO: 87.4 FL (ref 78–98)
MONOCYTES # BLD AUTO: 0.5 THOU/UL (ref 0.11–0.59)
MONOCYTES NFR BLD AUTO: 5.2 % (ref 0–10)
NEUTROPHILS # BLD AUTO: 6.2 THOU/UL (ref 1.4–6.5)
NEUTROPHILS NFR BLD AUTO: 71.5 % (ref 42–75)
PATHC CAST-AUWI FLAG: 0 (ref 0–2.49)
PLATELET # BLD AUTO: 226 THOU/UL (ref 130–400)
POTASSIUM SERPL-SCNC: 5.3 MMOL/L (ref 3.5–5.1)
RBC # BLD AUTO: 4.56 MILL/UL (ref 4.2–5.4)
RBC UR QL AUTO: (no result) HPF (ref 0–3)
SODIUM SERPL-SCNC: 130 MMOL/L (ref 136–145)
SP GR UR STRIP: 1 (ref 1–1.04)
SPERM-AUWI FLAG: 0 (ref 0–9.9)
TROPONIN I SERPL DL<=0.01 NG/ML-MCNC: (no result) NG/ML (ref ?–0.03)
WBC # BLD AUTO: 8.6 THOU/UL (ref 4.8–10.8)
WBC UR QL AUTO: (no result) HPF (ref 0–3)
YEAST-AUWI FLAG: 0 (ref 0–25)

## 2018-08-30 PROCEDURE — 83605 ASSAY OF LACTIC ACID: CPT

## 2018-08-30 PROCEDURE — 36416 COLLJ CAPILLARY BLOOD SPEC: CPT

## 2018-08-30 PROCEDURE — 99285 EMERGENCY DEPT VISIT HI MDM: CPT

## 2018-08-30 PROCEDURE — G0378 HOSPITAL OBSERVATION PER HR: HCPCS

## 2018-08-30 PROCEDURE — 97139 UNLISTED THERAPEUTIC PX: CPT

## 2018-08-30 PROCEDURE — A4353 INTERMITTENT URINARY CATH: HCPCS

## 2018-08-30 PROCEDURE — A4216 STERILE WATER/SALINE, 10 ML: HCPCS

## 2018-08-30 PROCEDURE — 80053 COMPREHEN METABOLIC PANEL: CPT

## 2018-08-30 PROCEDURE — 96360 HYDRATION IV INFUSION INIT: CPT

## 2018-08-30 PROCEDURE — 82553 CREATINE MB FRACTION: CPT

## 2018-08-30 PROCEDURE — 36415 COLL VENOUS BLD VENIPUNCTURE: CPT

## 2018-08-30 PROCEDURE — 85025 COMPLETE CBC W/AUTO DIFF WBC: CPT

## 2018-08-30 PROCEDURE — 93306 TTE W/DOPPLER COMPLETE: CPT

## 2018-08-30 PROCEDURE — 81003 URINALYSIS AUTO W/O SCOPE: CPT

## 2018-08-30 PROCEDURE — 84484 ASSAY OF TROPONIN QUANT: CPT

## 2018-08-30 PROCEDURE — 83690 ASSAY OF LIPASE: CPT

## 2018-08-30 PROCEDURE — 93005 ELECTROCARDIOGRAM TRACING: CPT

## 2018-08-30 PROCEDURE — 80061 LIPID PANEL: CPT

## 2018-08-30 PROCEDURE — 81015 MICROSCOPIC EXAM OF URINE: CPT

## 2018-08-30 PROCEDURE — 71045 X-RAY EXAM CHEST 1 VIEW: CPT

## 2018-08-30 PROCEDURE — 80048 BASIC METABOLIC PNL TOTAL CA: CPT

## 2018-08-30 PROCEDURE — 51701 INSERT BLADDER CATHETER: CPT

## 2018-08-30 PROCEDURE — 84443 ASSAY THYROID STIM HORMONE: CPT

## 2018-08-30 PROCEDURE — 83880 ASSAY OF NATRIURETIC PEPTIDE: CPT

## 2018-08-30 NOTE — RAD
PORTABLE CHEST ONE VIEW:

 

Date: 8-29-18 

Time: 12:29 p.m.

 

History: Weakness. Dyspnea.

 

FINDINGS:

Comparison is made with exam of 3-15-18. 

 

The heart size is normal. The aorta is tortuous. There is evidence of old granulomatous disease. The 
lungs are well expanded without focal areas of consolidation, pneumothoraces, or pleural effusions. 

 

IMPRESSION: 

No acute process. 

 

POS: OFF

## 2018-08-30 NOTE — HP
DATE OF ADMISSION:  2018

 

PRIMARY CARE PHYSICIAN:  Dr. Lily Quispe.

 

REASON FOR ADMISSION:  Chest discomfort, generalized weakness, hyponatremia.

 

HISTORY OF PRESENT ILLNESS:  An 86-year-old female who is a very poor historian who came to emergency
 room with multiple complaints.  She was reporting to emergency room physician about chest discomfort
, but she denied chest discomfort to me.  She was reporting to me that she was having epigastric disc
omfort as well as chronic low back pain.  The patient took her morphine and methocarbamol before comi
ng to the emergency room.  The patient was also having weakness and she denies any fall.  She denies 
any palpitations.  She denies any shortness of breath.  She denies any constipation, diarrhea, melena
 or hematochezia.  The patient is taking all her previous medication.  The patient reports that she i
s supposed to see Dr. Pinon and they are going to order an echocardiography, but today she was not
 feeling good and that is why family member brought her to emergency room for evaluation.  The patien
t reports that her appetite is reduced and she is feeling on and off shortness of breath.  She denies
 any every day basis orthopnea, PND or pedal edema.  She denies any fever or chills.  She denies any 
UTI symptoms.  She denies any constipation, diarrhea, melena or hematochezia.  She has a history of a
trial fibrillation and she is on chronic anticoagulation with Xarelto.

 

When I saw this patient in the emergency room, she reported that she is ready to go home, but her fam
carol member wanted to keep her in hospital to rule out cardiac etiology.

 

ALLERGIES:  No known drug allergy.

 

CURRENT HOME MEDICATIONS:  Morphine sulfate 30 mg q.8 hourly p.r.n., aspirin 325 mg p.o. daily, Lasix
 40 mg daily, methocarbamol 750 mg daily, omeprazole 20 mg daily, Reglan 5 mg daily, gabapentin 300 m
g p.o. daily, Xarelto 20 mg p.o. daily.

 

REVIEW OF SYSTEMS:  The following complete review of systems was negative, unless otherwise mentioned
 in the HPI or below:  Constitutional:  Weight loss or gain, ability to conduct usual activities.  Sk
in:  Rash, itching.  Eyes:  Double vision, pain.  ENT/Mouth:  Nose bleeding, neck stiffness, pain, te
nderness.  Cardiovascular:  Palpitations, dyspnea on exertion, orthopnea.  Respiratory:  Shortness of
 breath, wheezing, cough, hemoptysis, fever or night sweats.  Gastrointestinal:  Poor appetite, abdom
inal pain, heartburn, nausea, vomiting, constipation, or diarrhea.  Genitourinary:  Urgency, frequenc
y, dysuria, nocturia.  Musculoskeletal:  Pain, swelling.  Neurologic/Psychiatric:  Anxiety, depressio
n.  Allergy/Immunologic:  Skin rash, bleeding tendency.  Please see my HPI for pertinent positive and
 negative.  All other review of systems reviewed and negative except as mentioned in the HPI.

 

PAST MEDICAL HISTORY:  Chronic low back pain, paroxysmal atrial fibrillation, gastroesophageal reflux
 disease, moderate mitral regurgitation, moderate tricuspid regurgitation, history of carotid stenosi
s on the right side.

 

PAST SURGICAL HISTORY:  Back surgery x6, right foot surgery, bilateral hand surgery due to rheumatoid
 arthritis, left and right ankle surgery, left shoulder surgery x2, the patient has screws and plate 
to the right ankle, appendicectomy, hysterectomy, tonsillectomy, internal spinal cord stimulator.

 

PAST PSYCHIATRIC HISTORY:  Anxiety and depression.

 

SOCIAL HISTORY:  The patient lives at home.  She is able to walk with a walker.  No history of tobacc
o, alcohol or illicit drug abuse.

 

FAMILY HISTORY:  Mother  of congestive heart failure in her 80s.  Father  of congestive heart
 failure in his 80s.  Her  had MI and Crohn's disease.

 

EMERGENCY ROOM COURSE:  The patient is given aspirin 162 mg, Tylenol 1 g and IV fluid.

 

PHYSICAL EXAMINATION:

VITAL SIGNS:  On arrival, blood pressure 177/95, pulse 92, irregular, respiratory rate 18, temperatur
e 98.0, saturation 99% on room air, weight 68 kilograms.

GENERAL:  The patient is currently alert, awake, in no obvious acute distress.

HEAD:  Normocephalic, atraumatic.

EYES:  Pupils round, reactive to light.  Extraocular muscle intact.

ENT:  Poor dentition.  Oropharynx within normal limits.  Moist mucous membranes.  No oral lesion, no 
pharyngeal erythema, no exudate.

NECK:  Supple, no JVD, no thyromegaly, no carotid bruit.

LUNGS:  Air entry reduced both basally.  No wheeze, no rhonchi.

CARDIAC:  S1, S2 irregular.  Systolic murmur present at lower left sternal border as well as at apex.


ABDOMEN:  Soft, bowel sounds present, nontender, nondistended.  No organomegaly, no mass, no suprapub
ic tenderness.

BACK:  Unremarkable.  No CVA tenderness.

EXTREMITIES:  Upper extremities, passive movement of all joints are normal.  Lower extremities, no ed
ema.  Good distal pulsation.  No calf tenderness.

SKIN:  No skin rash.

HEMATOLOGICAL:  No lymphadenopathy.

PSYCHIATRIC:  Normal affect.

NEUROLOGIC:  The patient is currently alert, awake, in no obvious acute distress.

 

SIGNIFICANT LABORATORY DATA:  EKG showing atrial fibrillation with controlled ventricular response, n
onspecific ST-T changes.  Chest x-ray based on my review, no acute cardiopulmonary process.  BMP, sod
ium 130, potassium 5.3, chloride 103, carbon dioxide 19, anion gap 13, BUN 8, creatinine 0.72, glucos
e 120, calcium 8.7, lactic acid 1.4.  LFT, AST 23, ALT 7, alkaline phosphatase 95, albumin 3.5.  BNP 
141.9.  CK-MB 2.0.  Troponin I less than 0.010.  TSH 1.47.

 

IMAGING:  CBC is not ordered.  We are going to order CBC and review later.

 

ASSESSMENT AND PLAN:

1.  Chest discomfort, rule out acute coronary syndrome.  We will do serial cardiac enzymes x3.  We wi
ll obtain echocardiography.  Currently, the patient does not have any chest pain, probably related wi
th underlying atrial fibrillation.

2.  Atrial fibrillation with controlled ventricular response.  The patient's rate is under control.  
We will continue with metoprolol tartrate 50 mg p.o. b.i.d. and anticoagulation with Xarelto 20 mg p.
o. daily.

3.  Hyponatremia and hyperkalemia.  The patient is given IV fluid in the emergency room, most likely 
related with her reduced protein intake.  We will repeat BMP tomorrow.  The patient is already given 
IV fluid in the emergency room.

4.  History of moderate mitral regurgitation and moderate tricuspid regurgitation.  We will obtain ec
hocardiography and verify current status.  The patient currently appears euvolemic without any eviden
ce of congestive heart failure.

5.  Right carotid stenosis based on 2017 study.  The patient does have moderate carotid sten
osis on the right side.  She needs to follow up with the cardiovascular surgeon as an outpatient ana maria jamison.

6.  Gastroesophageal reflux disease.  We will continue Protonix 40 mg p.o. daily.

7.  Chronic low back pain.  We will continue morphine sulfate 30 mg q.8 hourly.

8.  Chronic anticoagulation.  We will continue Xarelto 20 mg p.o. daily.

9.  Deep venous thrombosis prophylaxis not needed because we are expecting discharge in 24 hours.

10.  Gastrointestinal prophylaxis, Protonix 40 mg p.o. daily.

11.  Generalized weakness.  We will evaluate with physical therapy while in hospital.

 

Disposition plan based on clinical course, likely within 24 hours.  Plan of care discussed with the p
sarina's family member at bedside in the emergency room.

 

CODE STATUS:  This patient is full code.  The patient's  is surrogate decision maker.

## 2018-08-31 VITALS — TEMPERATURE: 97.9 F

## 2018-08-31 VITALS — DIASTOLIC BLOOD PRESSURE: 69 MMHG | SYSTOLIC BLOOD PRESSURE: 138 MMHG

## 2018-08-31 LAB
ANION GAP SERPL CALC-SCNC: 11 MMOL/L (ref 10–20)
BASOPHILS # BLD AUTO: 0.1 THOU/UL (ref 0–0.2)
BASOPHILS NFR BLD AUTO: 0.8 % (ref 0–1)
BUN SERPL-MCNC: 6 MG/DL (ref 9.8–20.1)
CALCIUM SERPL-MCNC: 8.5 MG/DL (ref 7.8–10.44)
CHD RISK SERPL-RTO: 4 (ref ?–4.5)
CHLORIDE SERPL-SCNC: 107 MMOL/L (ref 98–107)
CHOLEST SERPL-MCNC: 229 MG/DL
CO2 SERPL-SCNC: 21 MMOL/L (ref 23–31)
CREAT CL PREDICTED SERPL C-G-VRATE: 70 ML/MIN (ref 70–130)
EOSINOPHIL # BLD AUTO: 0.1 THOU/UL (ref 0–0.7)
EOSINOPHIL NFR BLD AUTO: 1.8 % (ref 0–10)
GLUCOSE SERPL-MCNC: 98 MG/DL (ref 83–110)
HDLC SERPL-MCNC: 57 MG/DL
HGB BLD-MCNC: 12 G/DL (ref 12–16)
LDLC SERPL CALC-MCNC: 159 MG/DL
LYMPHOCYTES # BLD: 2.3 THOU/UL (ref 1.2–3.4)
LYMPHOCYTES NFR BLD AUTO: 34.2 % (ref 21–51)
MCH RBC QN AUTO: 30 PG (ref 27–31)
MCV RBC AUTO: 86.7 FL (ref 78–98)
MONOCYTES # BLD AUTO: 0.6 THOU/UL (ref 0.11–0.59)
MONOCYTES NFR BLD AUTO: 8.6 % (ref 0–10)
NEUTROPHILS # BLD AUTO: 3.7 THOU/UL (ref 1.4–6.5)
NEUTROPHILS NFR BLD AUTO: 54.7 % (ref 42–75)
PLATELET # BLD AUTO: 211 THOU/UL (ref 130–400)
POTASSIUM SERPL-SCNC: 3.2 MMOL/L (ref 3.5–5.1)
RBC # BLD AUTO: 3.98 MILL/UL (ref 4.2–5.4)
SODIUM SERPL-SCNC: 136 MMOL/L (ref 136–145)
TRIGL SERPL-MCNC: 67 MG/DL (ref ?–150)
WBC # BLD AUTO: 6.9 THOU/UL (ref 4.8–10.8)

## 2018-08-31 NOTE — PDOC.PN
- Subjective


Encounter Start Date: 08/31/18


Encounter Start Time: 09:00


-: old records requested/rev





Patient seen and examined. No new complaints. No overnight events





- Objective


Resuscitation Status: 


 











Resuscitation Status           FULL:Full Resuscitation














MAR Reviewed: Yes


Vital Signs & Weight: 


 Vital Signs (12 hours)











  Temp Pulse Resp BP BP Pulse Ox


 


 08/31/18 07:30  97.9 F  84  16  136/69   98


 


 08/31/18 04:51  97.7 F  81  14   118/67  98


 


 08/31/18 00:08   103 H  14  140/83   97








 Weight











Weight                         154 lb 14.4 oz














I&O: 


 











 08/30/18 08/31/18 09/01/18





 06:59 06:59 06:59


 


Intake Total  50 


 


Output Total  400 


 


Balance  -350 











Result Diagrams: 


 08/31/18 01:38





 08/31/18 01:38


Radiology Reviewed by me: Yes


EKG Reviewed by me: Yes





Phys Exam





- Physical Examination


Constitutional: NAD


HEENT: PERRLA, moist MMs, sclera anicteric


Neck: no JVD, supple


Respiratory: no wheezing, no rales, no rhonchi


Cardiovascular: irregular


sm+


Gastrointestinal: soft, non-tender, no distention, positive bowel sounds


Musculoskeletal: no edema, pulses present


Neurological: non-focal, normal sensation, moves all 4 limbs


Psychiatric: normal affect, A&O x 3


Skin: no rash, normal turgor





Dx/Plan


(1) Chest pain


Code(s): R07.9 - CHEST PAIN, UNSPECIFIED   Status: Resolved   





(2) Hyperkalemia


Code(s): E87.5 - HYPERKALEMIA   Status: Resolved   





(3) Hyponatremia


Code(s): E87.1 - HYPO-OSMOLALITY AND HYPONATREMIA   Status: Resolved   





(4) Weakness generalized


Code(s): R53.1 - WEAKNESS   Status: Resolved   





(5) Carotid stenosis, right


Code(s): I65.21 - OCCLUSION AND STENOSIS OF RIGHT CAROTID ARTERY   Status: 

Chronic   





(6) Chronic low back pain


Code(s): M54.5 - LOW BACK PAIN; G89.29 - OTHER CHRONIC PAIN   Status: Chronic   





(7) GERD (gastroesophageal reflux disease)


Code(s): K21.9 - GASTRO-ESOPHAGEAL REFLUX DISEASE WITHOUT ESOPHAGITIS   Status: 

Chronic   





(8) Moderate mitral regurgitation


Code(s): I34.0 - NONRHEUMATIC MITRAL (VALVE) INSUFFICIENCY   Status: Chronic   





(9) Moderate tricuspid regurgitation


Code(s): I07.1 - RHEUMATIC TRICUSPID INSUFFICIENCY   Status: Chronic   





(10) Atrial fibrillation


Code(s): I48.91 - UNSPECIFIED ATRIAL FIBRILLATION   Status: Acute   


Qualifiers: 


   Atrial fibrillation type: chronic   Qualified Code(s): I48.2 - Chronic 

atrial fibrillation   





- Plan


cont current plan of care





* continue metoprolol and xarelto


* outpt follow up with cardiology


* add lipitor for dyslipidemia


* medication reviewed as below


* symptomatic treatment.


* echo pending


* acs ruled out








Review of Systems





- Review of Systems


Eyes: negative: Pain, Vision Change, Conjunctivae Inflammation, Eyelid 

Inflammation, Redness, Other


ENT: negative: Ear Pain, Ear Discharge, Nose Pain, Nose Discharge, Nose 

Congestion, Mouth Pain, Mouth Swelling, Throat Pain, Throat Swelling, Other


Respiratory: negative: Cough, Dry, Shortness of Breath, Hemoptysis, SOB with 

Excertion, Pleuritic Pain, Sputum, Wheezing


Cardiovascular: negative: chest pain, palpitations, orthopnea, paroxysmal 

nocturnal dyspnea, edema, light headedness, other


Gastrointestinal: negative: Nausea, Vomiting, Abdominal Pain, Diarrhea, 

Constipation, Melena, Hematochezia, Other


Genitourinary: negative: Dysuria, Frequency, Incontinence, Hematuria, Retention

, Other


Musculoskeletal: negative: Neck Pain, Shoulder Pain, Arm Pain, Back Pain, Hand 

Pain, Leg Pain, Foot Pain, Other


Skin: negative: Rash, Lesions, Edilson, Bruising, Other





- Medications/Allergies


Allergies/Adverse Reactions: 


 Allergies











Allergy/AdvReac Type Severity Reaction Status Date / Time


 


No Known Drug Allergies Allergy   Verified 08/30/18 16:00











Medications: 


 Current Medications





Acetaminophen (Tylenol)  650 mg PO Q4H PRN


   PRN Reason: Headache/Fever or Pain


Hydrocodone Bitart/Acetaminophen (Norco 10/325)  1 tab PO Q4H PRN


   PRN Reason: Moderate Pain (4-6)


   Last Admin: 08/31/18 03:09 Dose:  1 tab


Al Hydroxide/Mg Hydroxide (Maalox)  30 ml PO Q6H PRN


   PRN Reason: Heartburn  or Indigestion


Artificial Tears (Tears Renewed 15ml Bottle)  0 drop EA EYE PRN PRN


   PRN Reason: Dry Eyes


Aspirin (Aspirin Chewable)  81 mg PO DAILY [facility]


   Last Admin: 08/31/18 08:43 Dose:  81 mg


Atorvastatin Calcium (Lipitor)  20 mg PO HS [facility]


Bisacodyl (Dulcolax)  10 mg CA Q24H PRN


   PRN Reason: Constipation


Furosemide (Lasix)  40 mg PO DAILY-[facility]


   Last Admin: 08/31/18 08:43 Dose:  40 mg


Gabapentin (Neurontin)  300 mg PO HS [facility]


Guaifenesin (Robitussin Sf)  200 mg PO Q4H PRN


   PRN Reason: Cough


Hydralazine HCl (Apresoline)  10 mg SLOW IVP Q4H PRN


   PRN Reason: Systolic BP > 180


Loperamide HCl (Imodium)  2 mg PO PRN PRN


   PRN Reason: Diarrhea/Loose Stools


Loratadine (Claritin)  10 mg PO DAILYPRN PRN


   PRN Reason: Sinus Symptoms


Magnesium Hydroxide (Milk Of Magnesium)  30 ml PO DAILYPRN PRN


   PRN Reason: Constipation


Methocarbamol (Robaxin)  750 mg PO DAILY [facility]


   Last Admin: 08/31/18 08:44 Dose:  750 mg


Mineral Oil/White Petrolatum (Eucerin Cream)  0 gm TOP BIDPRN PRN


   PRN Reason: Dry Skin


Morphine Sulfate (Ms Contin)  30 mg PO Q12HR [facility]


   Last Admin: 08/31/18 08:45 Dose:  30 mg


Nitroglycerin (Nitrostat)  0.4 mg SL Q5MIN PRN


   PRN Reason: Chest Pain


Ondansetron HCl (Zofran Odt)  4 mg PO Q6H PRN


   PRN Reason: Nausea/Vomiting


Ondansetron HCl (Zofran)  4 mg IVP Q6H PRN


   PRN Reason: Nausea/Vomiting


Pantoprazole Sodium (Protonix)  40 mg PO DAILY [facility]


   Last Admin: 08/31/18 08:45 Dose:  40 mg


Phenol (Chloraseptic Spray 180 Ml Bot)  0 ml PO PRN PRN


   PRN Reason: Sore Throat


Rivaroxaban (Xarelto)  20 mg PO 0600 [facility]


   Last Admin: 08/31/18 05:52 Dose:  20 mg


Senna (Senokot)  2 tab PO HSPRN PRN


   PRN Reason: Constipation


Sodium Chloride (Ocean Nasal Spray 0.65%)  0 ml EA NARE QIDPRN PRN


   PRN Reason: Nasal Congestion


Sodium Chloride (Flush - Normal Saline)  10 ml IVF Q12HR [facility]


   Last Admin: 08/31/18 08:46 Dose:  10 ml


Sodium Chloride (Flush - Normal Saline)  10 ml IVF PRN PRN


   PRN Reason: Saline Flush


Zolpidem Tartrate (Ambien)  5 mg PO HSPRN PRN


   PRN Reason: Insomnia

## 2018-08-31 NOTE — DIS
DATE OF ADMISSION:  08/30/2018

 

DATE OF DISCHARGE:  08/31/2018

 

PRIMARY CARE PHYSICIAN:  Dr. Lily Quispe.

 

DISCHARGE DISPOSITION:  Home.

 

PRIMARY DISCHARGE DIAGNOSES:  Chest pain, ruled out acute coronary syndrome; hyperkalemia, resolved; 
dyslipidemia; hyponatremia, corrected; generalized weakness, resolved.

 

SECONDARY DISCHARGE DIAGNOSES:  Right carotid stenosis, chronic low back pain, gastroesophageal reflu
x disease, moderate mitral regurgitation and moderate tricuspid regurgitation.

 

PRIMARY PROCEDURE/OPERATION:  None.

 

RADIOLOGICAL INVESTIGATION:  Chest x-ray was normal.

 

SIGNIFICANT LABORATORY DATA:  WBC is 6.9, hemoglobin 12.0, platelet 211.  Sodium 136, potassium 3.2, 
BUN 6, creatinine 0.64, calcium 8.5.  Cardiac enzymes negative x3.  Lactic acid 1.4, TSH 1.47, LDL 15
9.  Urinalysis unremarkable.

 

DISCHARGE MEDICATIONS:  Morphine sulfate extended release 30 mg q.8 hourly p.r.n., aspirin 325 mg p.o
. daily, Lasix 40 mg p.o. daily, gabapentin 300 mg p.o. at bedtime, Reglan 5 mg p.o. daily p.r.n., om
eprazole 20 mg p.o. daily, Xarelto 20 mg p.o. daily, metoprolol 50 mg twice daily, Lipitor 20 mg p.o.
 at bedtime.

 

CONTRAINDICATIONS:  None.

 

CODE STATUS:  FULL CODE.

 

INPATIENT CONSULTANTS:  None.

 

ALLERGIES:  No known drug allergy.

 

DISCHARGE PLAN:  Post hospital, patient will follow up with primary care physician.  Patient already 
has appointment with Dr. Pinon as an outpatient basis.

 

HOSPITAL COURSE:  An 86-year-old female who was admitted by me.  Please see my HPI for further detail
s.  The patient expressed complaint of chest pain to ER physician, but she declined that complaint to
 me when we took history from her.  She was feeling generalized weak.  She was having multiple vague 
complaints.  She was evaluated in the emergency room and her EKG showed atrial fibrillation.  She has
 history of atrial fibrillation and she was on metoprolol as well as with Xarelto for chronic anticoa
gulation, which we continued while in hospital.  During this admission, routine blood tests showed in
itially hyponatremia, hyperkalemia which was resolved the next day.  Patient was observed on telemetr
y floor and she remained in atrial fibrillation, but rate was under control.  Her LDL was significant
ly elevated and that is why we started Lipitor therapy.  Rest of medications will continue as per pre
vious.  She will follow up with primary care physician as well as primary cardiologist.

 

During this admission, we did echocardiography, but official report is pending.

 

The patient is seen and examined at bedside today.

 

PHYSICAL EXAMINATION:

VITAL SIGNS:  Currently, temperature 97.9, pulse 84 irregular, respiratory rate 16, saturation 98% on
 room air, blood pressure 136/69, weight 154 pounds.

GENERAL:  The patient is currently alert, awake, no obvious acute distress.

HEAD:  Normocephalic, atraumatic.

EYES:  Pupils round, reactive to light.  Extraocular muscle intact.

ENT:  Oropharynx within normal limits.

LUNGS:  Clear to auscultation without any rhonchi or rales.

CARDIAC:  S1, S2 irregular.  No systolic murmur noted at apex and left parasternal border.  No gallop
, no rub.

ABDOMEN:  Soft and benign without any tenderness.

EXTREMITIES:  No edema.

NEUROLOGIC:  Nonfocal examination.

 

Overall, patient is medically stable for discharge today.

## 2018-09-01 NOTE — EKG
Test Reason : ER

Blood Pressure : ***/*** mmHG

Vent. Rate : 095 BPM     Atrial Rate : 090 BPM

   P-R Int : 000 ms          QRS Dur : 076 ms

    QT Int : 340 ms       P-R-T Axes : 000 -03 257 degrees

   QTc Int : 427 ms

 

Atrial fibrillation with premature ventricular or aberrantly conducted complexes

Low voltage QRS

Nonspecific ST and T wave abnormality

Abnormal ECG

 

Confirmed by FADY RODRIGUEZ M.D. ([id number]),  RUTH CARVALHO ([id number]) on 9/1/2018 8:21:50 PM

 

Referred By:             Confirmed By:FADY RODRIGUEZ M.D.

## 2018-11-23 ENCOUNTER — HOSPITAL ENCOUNTER (EMERGENCY)
Dept: HOSPITAL 92 - ERS | Age: 83
Discharge: NURSING, REHAB OR LONG-TERM CARE | End: 2018-11-23
Payer: MEDICARE

## 2018-11-23 DIAGNOSIS — Z74.2: ICD-10-CM

## 2018-11-23 DIAGNOSIS — T14.8XXA: Primary | ICD-10-CM
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
## 2018-11-23 DIAGNOSIS — R53.81: ICD-10-CM

## 2018-11-23 DIAGNOSIS — Z79.82: ICD-10-CM

## 2018-11-23 DIAGNOSIS — Z79.01: ICD-10-CM

## 2018-11-23 DIAGNOSIS — Z79.899: ICD-10-CM

## 2018-11-23 LAB
ALBUMIN SERPL BCG-MCNC: 3.3 G/DL (ref 3.4–4.8)
ALP SERPL-CCNC: 113 U/L (ref 40–150)
ALT SERPL W P-5'-P-CCNC: 8 U/L (ref 8–55)
ANION GAP SERPL CALC-SCNC: 13 MMOL/L (ref 10–20)
AST SERPL-CCNC: 14 U/L (ref 5–34)
BASOPHILS # BLD AUTO: 0 THOU/UL (ref 0–0.2)
BASOPHILS NFR BLD AUTO: 0.3 % (ref 0–1)
BILIRUB SERPL-MCNC: 1.3 MG/DL (ref 0.2–1.2)
BUN SERPL-MCNC: 9 MG/DL (ref 9.8–20.1)
CALCIUM SERPL-MCNC: 8.6 MG/DL (ref 7.8–10.44)
CHLORIDE SERPL-SCNC: 97 MMOL/L (ref 98–107)
CO2 SERPL-SCNC: 26 MMOL/L (ref 23–31)
CREAT CL PREDICTED SERPL C-G-VRATE: 0 ML/MIN (ref 70–130)
EOSINOPHIL # BLD AUTO: 0 THOU/UL (ref 0–0.7)
EOSINOPHIL NFR BLD AUTO: 0.2 % (ref 0–10)
GLOBULIN SER CALC-MCNC: 2.7 G/DL (ref 2.4–3.5)
GLUCOSE SERPL-MCNC: 156 MG/DL (ref 83–110)
HGB BLD-MCNC: 11.9 G/DL (ref 12–16)
LYMPHOCYTES # BLD: 0.9 THOU/UL (ref 1.2–3.4)
LYMPHOCYTES NFR BLD AUTO: 5.9 % (ref 21–51)
MCH RBC QN AUTO: 28.2 PG (ref 27–31)
MCV RBC AUTO: 87.4 FL (ref 78–98)
MONOCYTES # BLD AUTO: 1.7 THOU/UL (ref 0.11–0.59)
MONOCYTES NFR BLD AUTO: 12 % (ref 0–10)
NEUTROPHILS # BLD AUTO: 11.8 THOU/UL (ref 1.4–6.5)
NEUTROPHILS NFR BLD AUTO: 81.6 % (ref 42–75)
PLATELET # BLD AUTO: 232 THOU/UL (ref 130–400)
POTASSIUM SERPL-SCNC: 3.9 MMOL/L (ref 3.5–5.1)
RBC # BLD AUTO: 4.24 MILL/UL (ref 4.2–5.4)
SODIUM SERPL-SCNC: 132 MMOL/L (ref 136–145)
SP GR UR STRIP: 1.01 (ref 1–1.04)
WBC # BLD AUTO: 14.5 THOU/UL (ref 4.8–10.8)

## 2018-11-23 PROCEDURE — 80053 COMPREHEN METABOLIC PANEL: CPT

## 2018-11-23 PROCEDURE — 71045 X-RAY EXAM CHEST 1 VIEW: CPT

## 2018-11-23 PROCEDURE — 85025 COMPLETE CBC W/AUTO DIFF WBC: CPT

## 2018-11-23 PROCEDURE — 81003 URINALYSIS AUTO W/O SCOPE: CPT

## 2018-11-23 PROCEDURE — A4353 INTERMITTENT URINARY CATH: HCPCS

## 2018-11-23 PROCEDURE — 36415 COLL VENOUS BLD VENIPUNCTURE: CPT

## 2018-11-23 PROCEDURE — 93005 ELECTROCARDIOGRAM TRACING: CPT

## 2018-11-23 PROCEDURE — 51701 INSERT BLADDER CATHETER: CPT

## 2018-11-23 NOTE — RAD
FRONTAL VIEW CHEST:

 

COMPARISON: 

8/30/2018.

 

INDICATION: 

Fall.

 

FINDINGS: 

There is accentuation of the cardiomediastinal silhouette.  Minimal patchy densities are seen at each
 lung base without lobar consolidation, significant effusion, or discrete pneumothorax.  Stable calci
fic density overlies the mediastinum.  Subtle contour deformity is seen posterolaterally involving th
e left 7th rib, age indeterminate.

 

IMPRESSION: 

Subtle contour deformity of the left 7th rib posterolaterally, age indeterminate.  Recommend clinical
 correlation.  Minimal bibasilar patchy densities could be on the basis of volume loss.

 

POS: [facility]

## 2018-12-10 ENCOUNTER — HOSPITAL ENCOUNTER (INPATIENT)
Dept: HOSPITAL 92 - ERS | Age: 83
LOS: 3 days | Discharge: NURSING, REHAB OR LONG-TERM CARE | DRG: 309 | End: 2018-12-13
Attending: INTERNAL MEDICINE | Admitting: INTERNAL MEDICINE
Payer: MEDICARE

## 2018-12-10 VITALS — BODY MASS INDEX: 23.6 KG/M2

## 2018-12-10 DIAGNOSIS — Z82.49: ICD-10-CM

## 2018-12-10 DIAGNOSIS — F32.9: ICD-10-CM

## 2018-12-10 DIAGNOSIS — Z79.82: ICD-10-CM

## 2018-12-10 DIAGNOSIS — Z86.73: ICD-10-CM

## 2018-12-10 DIAGNOSIS — I48.0: Primary | ICD-10-CM

## 2018-12-10 DIAGNOSIS — M54.5: ICD-10-CM

## 2018-12-10 DIAGNOSIS — Z79.01: ICD-10-CM

## 2018-12-10 DIAGNOSIS — K21.9: ICD-10-CM

## 2018-12-10 DIAGNOSIS — I50.32: ICD-10-CM

## 2018-12-10 DIAGNOSIS — F41.9: ICD-10-CM

## 2018-12-10 DIAGNOSIS — I08.1: ICD-10-CM

## 2018-12-10 DIAGNOSIS — G89.29: ICD-10-CM

## 2018-12-10 DIAGNOSIS — E78.5: ICD-10-CM

## 2018-12-10 DIAGNOSIS — E87.1: ICD-10-CM

## 2018-12-10 DIAGNOSIS — D72.829: ICD-10-CM

## 2018-12-10 DIAGNOSIS — D64.9: ICD-10-CM

## 2018-12-10 DIAGNOSIS — I65.21: ICD-10-CM

## 2018-12-10 LAB
ALBUMIN SERPL BCG-MCNC: 2.9 G/DL (ref 3.4–4.8)
ALP SERPL-CCNC: 98 U/L (ref 40–150)
ALT SERPL W P-5'-P-CCNC: (no result) U/L (ref 8–55)
ANION GAP SERPL CALC-SCNC: 14 MMOL/L (ref 10–20)
AST SERPL-CCNC: 10 U/L (ref 5–34)
BASOPHILS # BLD AUTO: 0 THOU/UL (ref 0–0.2)
BASOPHILS NFR BLD AUTO: 0.2 % (ref 0–1)
BILIRUB SERPL-MCNC: 0.5 MG/DL (ref 0.2–1.2)
BUN SERPL-MCNC: 7 MG/DL (ref 9.8–20.1)
CALCIUM SERPL-MCNC: 8.8 MG/DL (ref 7.8–10.44)
CHLORIDE SERPL-SCNC: 95 MMOL/L (ref 98–107)
CO2 SERPL-SCNC: 27 MMOL/L (ref 23–31)
CREAT CL PREDICTED SERPL C-G-VRATE: 0 ML/MIN (ref 70–130)
EOSINOPHIL # BLD AUTO: 0 THOU/UL (ref 0–0.7)
EOSINOPHIL NFR BLD AUTO: 0.4 % (ref 0–10)
GLOBULIN SER CALC-MCNC: 3 G/DL (ref 2.4–3.5)
GLUCOSE SERPL-MCNC: 144 MG/DL (ref 83–110)
HGB BLD-MCNC: 9.5 G/DL (ref 12–16)
LYMPHOCYTES # BLD: 1 THOU/UL (ref 1.2–3.4)
LYMPHOCYTES NFR BLD AUTO: 8.5 % (ref 21–51)
MCH RBC QN AUTO: 27.3 PG (ref 27–31)
MCV RBC AUTO: 87.7 FL (ref 78–98)
MONOCYTES # BLD AUTO: 1 THOU/UL (ref 0.11–0.59)
MONOCYTES NFR BLD AUTO: 8.6 % (ref 0–10)
NEUTROPHILS # BLD AUTO: 9.4 THOU/UL (ref 1.4–6.5)
NEUTROPHILS NFR BLD AUTO: 82.3 % (ref 42–75)
PLATELET # BLD AUTO: 554 THOU/UL (ref 130–400)
POTASSIUM SERPL-SCNC: 4.7 MMOL/L (ref 3.5–5.1)
RBC # BLD AUTO: 3.49 MILL/UL (ref 4.2–5.4)
SODIUM SERPL-SCNC: 131 MMOL/L (ref 136–145)
SP GR UR STRIP: 1.01 (ref 1–1.03)
TROPONIN I SERPL DL<=0.01 NG/ML-MCNC: (no result) NG/ML (ref ?–0.03)
WBC # BLD AUTO: 11.4 THOU/UL (ref 4.8–10.8)

## 2018-12-10 PROCEDURE — 96365 THER/PROPH/DIAG IV INF INIT: CPT

## 2018-12-10 PROCEDURE — 93005 ELECTROCARDIOGRAM TRACING: CPT

## 2018-12-10 PROCEDURE — 82274 ASSAY TEST FOR BLOOD FECAL: CPT

## 2018-12-10 PROCEDURE — 84443 ASSAY THYROID STIM HORMONE: CPT

## 2018-12-10 PROCEDURE — 51701 INSERT BLADDER CATHETER: CPT

## 2018-12-10 PROCEDURE — 70450 CT HEAD/BRAIN W/O DYE: CPT

## 2018-12-10 PROCEDURE — 36415 COLL VENOUS BLD VENIPUNCTURE: CPT

## 2018-12-10 PROCEDURE — 96366 THER/PROPH/DIAG IV INF ADDON: CPT

## 2018-12-10 PROCEDURE — 80048 BASIC METABOLIC PNL TOTAL CA: CPT

## 2018-12-10 PROCEDURE — 80061 LIPID PANEL: CPT

## 2018-12-10 PROCEDURE — 82728 ASSAY OF FERRITIN: CPT

## 2018-12-10 PROCEDURE — 71045 X-RAY EXAM CHEST 1 VIEW: CPT

## 2018-12-10 PROCEDURE — 83880 ASSAY OF NATRIURETIC PEPTIDE: CPT

## 2018-12-10 PROCEDURE — 80053 COMPREHEN METABOLIC PANEL: CPT

## 2018-12-10 PROCEDURE — 94760 N-INVAS EAR/PLS OXIMETRY 1: CPT

## 2018-12-10 PROCEDURE — 81003 URINALYSIS AUTO W/O SCOPE: CPT

## 2018-12-10 PROCEDURE — 84484 ASSAY OF TROPONIN QUANT: CPT

## 2018-12-10 PROCEDURE — 85025 COMPLETE CBC W/AUTO DIFF WBC: CPT

## 2018-12-10 RX ADMIN — Medication SCH ML: at 20:38

## 2018-12-10 RX ADMIN — HYDROCODONE BITARTRATE AND ACETAMINOPHEN PRN TAB: 5; 325 TABLET ORAL at 17:34

## 2018-12-10 NOTE — HP
PRIMARY CARE PHYSICIAN:  Lily Quispe MD



REASON FOR ADMISSION:  Atrial fibrillation with RVR.



HISTORY OF PRESENT ILLNESS:  An 86-year-old female who recently came to 
emergency

room on 2018 with generalized weakness.  From the emergency room, 
the

patient was discharged to inpatient rehab facility.  Subsequently, the patient 
was

discharged to home about a week ago.  She stayed to rehab for one week.  At home
,

she was feeling weak.  She was feeling palpitation, mild dizziness and that is 
why

the family member brought her to emergency room for evaluation. 



As per report, family member gives history that Dr. Espinal at rehab suspected 
stroke

as the patient was having confusion and word-finding difficulty.  The patient 
also

had one episode of hematochezia, which she attributes due to hemorrhoid.  She 
has

underlying history of atrial fibrillation and she is on chronic anticoagulation 
with

Xarelto.  In the emergency room today, the patient had CT of brain, which showed

hypodensity in right frontal subcortical white matter.  Her chest x-ray was 
showing

new hazy pleural parenchymal opacity at left lung base, though the patient 
denies

any cough, nausea, vomiting, chest pain, shortness of breath.  In the emergency

room, routine blood test showed mild leukocytosis and mild hyponatremia.  This

patient already has chronic pain stimulator.  At this point, we are going to 
keep

this patient in hospital for atrial fibrillation with RVR and her generalized

weakness. 



REVIEW OF SYSTEMS:  CONSTITUTIONAL:  Negative for weight loss or gain, ability 
to

conduct usual activities. 

SKIN:  Negative for rash, itching. 

EYES:  Negative for double vision, pain. 

ENT/MOUTH:  Negative for nose bleeding, neck stiffness, pain, tenderness. 

CARDIOVASCULAR:  Negative for palpitations, dyspnea on exertion, orthopnea. 

RESPIRATORY:  Negative for shortness of breath, wheezing, cough, hemoptysis, 
fever

or night sweats. 

GASTROINTESTINAL:  Negative for poor appetite, abdominal pain, heartburn, nausea
,

vomiting, constipation, or diarrhea. 

GENITOURINARY:  Negative for urgency, frequency, dysuria, nocturia. 

MUSCULOSKELETAL:  Negative for pain, swelling. 

NEUROLOGIC/PSYCHIATRIC:  Negative for anxiety, depression. 

ALLERGY/IMMUNOLOGIC:  Negative for skin rash, bleeding tendency. 



Please see my HPI for pertinent positives and negative.  All other review of 
systems

reviewed and negative except as mentioned in HPI. 



ALLERGIES:  NO KNOWN DRUG ALLERGY.



CURRENT HOME MEDICATIONS:  

1. Morphine sulfate 30 mg q.8 hourly p.r.n.

2. Aspirin 81 mg daily.

3. Lasix 40 mg daily.

4. Methocarbamol 750 mg daily.

5. Reglan 5 mg daily.

6. Gabapentin 300 mg daily.

7. Xarelto 20 mg daily.

8. Norco 10 one tablet q.6 hourly p.r.n.



PAST MEDICAL HISTORY:  

1. Chronic atrial fibrillation.

2. Chronic low back pain.

3. Gastroesophageal reflux disease.

4. Moderate mitral and tricuspid regurgitation.

5. History of carotid stenosis on the right side.



PAST SURGICAL HISTORY:  Back surgery x6, right foot surgery, bilateral hand 
surgery

due to rheumatoid arthritis, left and right ankle surgery, left shoulder 
surgery x2.

 The patient also has screws and plates in her right ankle, appendicectomy,

hysterectomy, tonsillectomy, and internal spinal cord pain stimulator. 



PAST PSYCHIATRIC HISTORY:  Anxiety and depression.



SOCIAL HISTORY:  The patient lives at home currently.  She recently released 
from

rehab.  She is able to walk with a walker.  No history of tobacco, alcohol, or

illicit drug abuse. 



FAMILY HISTORY:  Mother  from congestive heart failure in her 80s.  Father 


from congestive heart failure in his 80s.  Her  had MI and Crohn 
disease. 



EMERGENCY ROOM COURSE:  The patient is started on Cardizem drip.



PHYSICAL EXAMINATION:

VITAL SIGNS:  On arrival, blood pressure 133/87, pulse 113 and irregular,

respiratory rate 20, temperature 98.7, saturation 99% on room air.  Weight 65.7 
kg. 

GENERAL:  The patient is currently alert and awake.  No obvious acute distress.

Hard of hearing. 

HEENT:  Head; normocephalic.  The patient does have left-sided facial droop.  
Eyes;

pupils round, reactive to light.  Extraocular muscle intact.  Conjunctivae are 
pale.

 ENT; pale mucous membranes.  No oral lesion.  No pharyngeal erythema.  No 
exudate. 

NECK:  Supple.  No JVD.  No thyromegaly.  No carotid bruit. 

LUNGS:  Clear to auscultation without any rhonchi or rales, though air entry 
reduced

at bases. 

CARDIAC:  S1 and S2.  Irregularly irregular.  Systolic murmur present 
parasternally

and apex.  No gallop.  No rub. 

ABDOMEN:  Soft.  Bowel sounds present.  Nontender.  Nondistended.  No 
organomegaly.

No mass.  No suprapubic tenderness. 

RECTAL:  Done in the emergency room showing hemorrhoids with brown stool. 

BACK:  Unremarkable.  No CVA tenderness. 

EXTREMITIES:  Upper extremities; passive movement of all joints are normal.  
Lower

extremities; trace lower extremity edema noted.  Good distal pulsation. 

SKIN:  No skin rash.  Pale. 

HEMATOLOGIC:  No lymphadenopathy. 

PSYCHIATRIC:  Normal affect. 

NEUROLOGIC:  The patient is alert, oriented x3.  No focal sensory deficit.  The

patient does have left-sided facial droop. 



SIGNIFICANT LABORATORY DATA:  EKG showing atrial fibrillation with RVR, 
nonspecific

ST-T changes.  CT of brain showing hypodensity in the right frontal subcortical

white matter.  Chest x-ray based on my review, no acute cardiopulmonary process

other than haziness on the left lung base. 



CBC; WBC 11.4, hemoglobin 9.5, platelets 554.  BMP; sodium 131, potassium 4.7,

chloride 95, carbon dioxide 27, anion gap 14, BUN 7, creatinine 0.61, glucose 
144,

calcium 8.8. 



LFTs; AST 10, ALT less than 7, alkaline phosphatase 98, albumin 2.9.  .1.

Urinalysis normal. 



ASSESSMENT AND PLAN:  Impression:

1. Generalized weakness, multifactorial.

2. Atrial fibrillation with rapid ventricular response in view of chronic atrial

fibrillation. CONTINUE CARDIZEM DRIP, CARDIOLOGY CONSULT

3. Anemia, normocytic, normochromic.  We will check ferritin level and if 
ferritin

low, then we will transfuse iron. 

4. Chronic diastolic heart failure.  The patient had most recent 
echocardiography in

2018.  The patient will be given Lasix 20 mg IV b.i.d. 

5. Chronic pain disorder with chronic low back pain.  The patient is taking 
morphine

sulfate p.r.n. basis.  She has a dorsal spine pain stimulator.  Continue 
gabapentin

300 mg p.o. at bedtime. 

6. Chronic anticoagulation.  Continue Xarelto 20 mg p.o. daily.

7. Suspected subacute stroke.  We will consult Neurology for evaluation.  This

patient cannot have MRI because of pain stimulator. 

8. Dyslipidemia.  We will continue Lipitor 20 mg p.o. at bedtime.

9. History of moderate mitral regurgitation and tricuspid regurgitation, stable.

10. Anxiety and depression, stable without any medicine.

11. Deep venous thrombosis prophylaxis.  The patient is already on Xarelto 
therapy.

12. Gastrointestinal prophylaxis, Pepcid 20 mg p.o. b.i.d.



CODE STATUS:  The patient is full code.



DISPOSITION PLAN:  Based on clinical course, plan of care discussed with the 
patient

and family member at bedside. 







Job ID:  [id number]



MTDD

## 2018-12-10 NOTE — CON
DATE OF CONSULTATION:  



REASON FOR CONSULTATION:  Confusion and atrial fibrillation.



HISTORY OF PRESENT ILLNESS:  Ms. Paulette Mckeon is a pleasant 86-year-old woman,

who is a patient of Dr. Ever Pinon.  She was last seen and evaluated in April of 2018.  She has a previous history of persistent atrial fibrillation.  She has

been on Eliquis 5 mg p.o. b.i.d., although compliance has been a concern.  She

re-presented with mental status changes.  There is concern for recurrent stroke.

She has no current complaints except for weakness.  No chest pain or pressure noted.

 She is currently rate controlled.  She is on IV Cardizem at 5 mg/hour. 



PAST MEDICAL HISTORY:  Atrial fibrillation, acid reflux, carotid stenosis.



HOME MEDICATIONS:  Include;

1. Xarelto.

2. Gabapentin.

3. Methocarbamol.

4. Reglan.

5. Aspirin.

6. Lasix.

7. Morphine.

8. Norco.



ALLERGIES:  NONE.



PAST SURGICAL HISTORY:  Back surgery, foot surgery, hand surgery, shoulder surgery,

and ankle surgery. 



SOCIAL HISTORY:  No current tobacco, tobacco, or alcohol use.  She is recently

released from rehab. 



REVIEW OF SYSTEMS:  Ten-point review of systems is reviewed and is negative.



PHYSICAL EXAMINATION:

GENERAL:  The patient is a pleasant female who is in no acute distress.  The patient

appears their stated age. 

VITAL SIGNS:  Blood pressure 133/74, pulse 105, temperature 98.1. 

NEUROLOGIC:  The patient is alert and oriented x3 with no focal neurologic deficits. 

HEENT:  Sclerae without icterus.  Mouth has moist mucous membranes with normal

pallor. 

NECK:  No JVD.  Carotid upstroke brisk.  No bruits bilaterally. 

LUNGS:  Clear to auscultation with unlabored respirations. 

BACK:  No scoliosis or kyphosis. 

CARDIAC:  Irregularly irregular. 

ABDOMEN:  Soft, nontender, nondistended.  No peritoneal signs present.  No

hepatosplenomegaly.  No abnormal striae. 

EXTREMITIES:  2+ femoral and 2+ dorsalis pedis pulses.  No cyanosis, clubbing, or

edema. 

SKIN:  No gross abnormalities.



PERTINENT LABORATORY DATA:  Hemoglobin 9.5, creatinine 0.61, . 



Last echo dated 12/20/2017 with LVEF 50% to 55%.



IMPRESSION:  

1. Atrial fibrillation.

2. Mental status changes.

3. ? stroke.



RECOMMENDATIONS:  At this point, we will continue IV Cardizem at 5 mg/hour.  We will

try supplemental p.o. Cardizem.  She has not been on medication for rate control in

the office.  We will add Cardizem at 30 mg one p.o. q.6 hours.  There is a question

of compliance as far as anticoagulation therapy versus confusion on properly taking

the medication.  CT scan of the brain dated 12/10/2018 did suggest hypodensity in

the right frontal subcortical white matter and MRI recommended. 







Job ID:  [id number]

## 2018-12-10 NOTE — RAD
FRONTAL RADIOGRAPH CHEST PORTABLE UPRIGHT:

 

DATE: 12/10/2018.

 

COMPARISON: 

8/30/2018.

 

HISTORY: 

Generalized weakness for over 1 week.

 

FINDINGS: 

No pneumothorax seen.  There is new hazy increased density in the left lung base suggesting partial c
onsolidation/collapse of the left lower lobe and probable small left pleural effusion.  

 

There are dorsal column stimulating leads overlying the lower thoracic spine.

 

Calcified nodes are noted in the left hilar region.

 

IMPRESSION: 

New hazy pleural and parenchymal opacity in the left lung base.  This may signify infectious pneumoni
tis/aspiration.  Followup imaging following treatment to document resolution advised.

 

POS: [facility]

## 2018-12-10 NOTE — CT
NONCONTRAST HEAD CT:

 

Date:   12/10/18 

 

 

HISTORY:  

Weakness. Left facial droop. Symptoms x1 week. 

 

COMPARISON:  

01/03/18. 

 

FINDINGS:

No parenchymal hemorrhage. No extra-axial hematoma. No midline shift. Basilar cisterns are patent. Br
ain volume, age-appropriate. Cortical gray-white matter differentiation preserved. Ventricles and sul
ci are patent and symmetric. 

 

Subtle hypodensity involving the right posterior frontal cortex subcortical white matter. Better inte
rrogation with MRI may be beneficial. 

 

Calvarium is intact. Adequate aeration of the sinuses and mastoid air cells. Cavernous carotid athero
sclerosis. 

 

IMPRESSION: 

1.  No evidence of intraparenchymal hemorrhage. 

2.  Hypodensity in the right frontal subcortical white matter. Better interrogation with MRI is recom
mended. 

 

 

POS: [facility]

## 2018-12-11 LAB
ANION GAP SERPL CALC-SCNC: 13 MMOL/L (ref 10–20)
BASOPHILS # BLD AUTO: 0 THOU/UL (ref 0–0.2)
BASOPHILS NFR BLD AUTO: 0.3 % (ref 0–1)
BUN SERPL-MCNC: 8 MG/DL (ref 9.8–20.1)
CALCIUM SERPL-MCNC: 8.4 MG/DL (ref 7.8–10.44)
CHD RISK SERPL-RTO: 3.4 (ref ?–4.5)
CHLORIDE SERPL-SCNC: 95 MMOL/L (ref 98–107)
CHOLEST SERPL-MCNC: 130 MG/DL
CO2 SERPL-SCNC: 27 MMOL/L (ref 23–31)
CREAT CL PREDICTED SERPL C-G-VRATE: 74 ML/MIN (ref 70–130)
EOSINOPHIL # BLD AUTO: 0.1 THOU/UL (ref 0–0.7)
EOSINOPHIL NFR BLD AUTO: 0.7 % (ref 0–10)
FERRITIN SERPL-MCNC: 612.83 NG/ML (ref 10–291)
GLUCOSE SERPL-MCNC: 113 MG/DL (ref 83–110)
HDLC SERPL-MCNC: 38 MG/DL
HGB BLD-MCNC: 9 G/DL (ref 12–16)
LDLC SERPL CALC-MCNC: 81 MG/DL
LYMPHOCYTES # BLD: 1.6 THOU/UL (ref 1.2–3.4)
LYMPHOCYTES NFR BLD AUTO: 15.1 % (ref 21–51)
MCH RBC QN AUTO: 28.3 PG (ref 27–31)
MCV RBC AUTO: 86.7 FL (ref 78–98)
MONOCYTES # BLD AUTO: 1 THOU/UL (ref 0.11–0.59)
MONOCYTES NFR BLD AUTO: 9.5 % (ref 0–10)
NEUTROPHILS # BLD AUTO: 7.7 THOU/UL (ref 1.4–6.5)
NEUTROPHILS NFR BLD AUTO: 74.4 % (ref 42–75)
PLATELET # BLD AUTO: 617 THOU/UL (ref 130–400)
POTASSIUM SERPL-SCNC: 3.9 MMOL/L (ref 3.5–5.1)
RBC # BLD AUTO: 3.18 MILL/UL (ref 4.2–5.4)
SODIUM SERPL-SCNC: 131 MMOL/L (ref 136–145)
TRIGL SERPL-MCNC: 55 MG/DL (ref ?–150)
TSH SERPL DL<=0.005 MIU/L-ACNC: 1.78 UIU/ML (ref 0.35–4.94)
WBC # BLD AUTO: 10.3 THOU/UL (ref 4.8–10.8)

## 2018-12-11 RX ADMIN — Medication SCH: at 09:44

## 2018-12-11 RX ADMIN — Medication SCH ML: at 09:42

## 2018-12-11 RX ADMIN — Medication SCH ML: at 23:06

## 2018-12-11 NOTE — PDOC.PN
- Subjective


Encounter Start Date: 12/11/18


Encounter Start Time: 07:20


-: old records requested/rev





rate controlled with cardizem drip no new problem


Patient seen and examined. No new complaints. No overnight events





- Objective


Resuscitation Status - Order Detail:





12/10/18 13:20


Resuscitation Status Routine 


   Resuscitation Status: FULL: Full Resuscitation








MAR Reviewed: Yes


Vital Signs & Weight: 


 Vital Signs (12 hours)











  Temp Pulse Resp BP Pulse Ox


 


 12/11/18 07:55  98.0 F  68  16  115/63  92 L


 


 12/11/18 03:56  98.6 F  73  20  107/61  96


 


 12/11/18 00:00  97.8 F  82  20  118/57 L  97








 Weight











Weight                         155 lb 4 oz














I&O: 


 











 12/10/18 12/11/18 12/12/18





 06:59 06:59 06:59


 


Output Total  1200 


 


Balance  -1200 











Result Diagrams: 


 12/11/18 04:28





 12/11/18 04:28


EKG Reviewed by me: Yes (afib)





Phys Exam





- Physical Examination


Constitutional: NAD


HEENT: PERRLA, moist MMs, sclera anicteric


Neck: no JVD, supple


Respiratory: no wheezing, no rales, no rhonchi


Cardiovascular: irregular


SM+


Gastrointestinal: soft, non-tender, no distention, positive bowel sounds


Musculoskeletal: no edema, pulses present


Neurological: non-focal, moves all 4 limbs


Lymphatic: no nodes


Psychiatric: normal affect


Skin: no rash, normal turgor





Dx/Plan


(1) Weakness generalized


Code(s): R53.1 - WEAKNESS   Status: Acute   





(2) Atrial fibrillation


Code(s): I48.91 - UNSPECIFIED ATRIAL FIBRILLATION   Status: Acute   


Qualifiers: 


   Atrial fibrillation type: paroxysmal   Qualified Code(s): I48.0 - Paroxysmal 

atrial fibrillation   


Comment: with RVR, controlled rate    





(3) Carotid stenosis, right


Code(s): I65.21 - OCCLUSION AND STENOSIS OF RIGHT CAROTID ARTERY   Status: 

Chronic   





(4) Chronic low back pain


Code(s): M54.5 - LOW BACK PAIN; G89.29 - OTHER CHRONIC PAIN   Status: Chronic   





(5) GERD (gastroesophageal reflux disease)


Code(s): K21.9 - GASTRO-ESOPHAGEAL REFLUX DISEASE WITHOUT ESOPHAGITIS   Status: 

Chronic   


Qualifiers: 


   Esophagitis presence: without esophagitis   Qualified Code(s): K21.9 - Gastro

-esophageal reflux disease without esophagitis   





(6) Moderate mitral regurgitation


Code(s): I34.0 - NONRHEUMATIC MITRAL (VALVE) INSUFFICIENCY   Status: Chronic   





(7) Moderate tricuspid regurgitation


Code(s): I07.1 - RHEUMATIC TRICUSPID INSUFFICIENCY   Status: Chronic   





- Plan


cont current plan of care





* continue cardizem cd


* continue metoprolol


* medication reviewed as below


* symptomatic treatment


* cardiology and neurology on case.


* continue lasix








Review of Systems





- Review of Systems


ENT: negative: Ear Pain, Ear Discharge, Nose Pain, Nose Discharge, Nose 

Congestion, Mouth Pain, Mouth Swelling, Throat Pain, Throat Swelling, Other


Respiratory: negative: Cough, Dry, Shortness of Breath, Hemoptysis, SOB with 

Excertion, Pleuritic Pain, Sputum, Wheezing


Cardiovascular: negative: chest pain, palpitations, orthopnea, paroxysmal 

nocturnal dyspnea, edema, light headedness, other


Gastrointestinal: negative: Nausea, Vomiting, Abdominal Pain, Diarrhea, 

Constipation, Melena, Hematochezia, Other


Genitourinary: negative: Dysuria, Frequency, Incontinence, Hematuria, Retention

, Other


Musculoskeletal: negative: Neck Pain, Shoulder Pain, Arm Pain, Back Pain, Hand 

Pain, Leg Pain, Foot Pain, Other


Skin: negative: Rash, Lesions, Edilson, Bruising, Other





- Medications/Allergies


Allergies/Adverse Reactions: 


 Allergies











Allergy/AdvReac Type Severity Reaction Status Date / Time


 


No Known Drug Allergies Allergy   Verified 08/30/18 16:00











Medications: 


 Current Medications





Acetaminophen (Tylenol)  650 mg PO Q4H PRN


   PRN Reason: Headache/Fever/Mild Pain (1-3)


Hydrocodone Bitart/Acetaminophen (Norco 5/325)  1 tab PO Q4H PRN


   PRN Reason: Moderate Pain (4-6)


   Last Admin: 12/10/18 17:34 Dose:  1 tab


Artificial Tears (Tears Renewed 15ml Bottle)  2 drop EA EYE PRN PRN


   PRN Reason: Dry Eyes


Aspirin (Aspirin Chewable)  81 mg PO 1000 [facility]


   Stop: 12/11/18 12:00


Aspirin (Aspirin Chewable)  81 mg PO DAILY [facility]


Atorvastatin Calcium (Lipitor)  20 mg PO HS [facility]


   Last Admin: 12/10/18 20:31 Dose:  20 mg


Bisacodyl (Dulcolax)  10 mg DE DAILYPRN PRN


   PRN Reason: Constipation


Calcium Carbonate (Tums)  1,000 mg PO Q4H PRN


   PRN Reason: Heartburn  or Indigestion


Diltiazem HCl (Cardizem Cd)  180 mg PO DAILY [facility]


Diltiazem HCl (Cardizem Cd)  180 mg PO 1000 [facility]


   Stop: 12/11/18 12:00


   Last Admin: 12/11/18 10:53 Dose:  180 mg


Famotidine (Pepcid)  20 mg PO BID [facility]


   Last Admin: 12/11/18 09:41 Dose:  20 mg


Furosemide (Lasix)  20 mg SLOW IVP 0600,1400 [facility]


   Last Admin: 12/11/18 06:19 Dose:  20 mg


Gabapentin (Neurontin)  300 mg PO HS [facility]


   Last Admin: 12/10/18 20:31 Dose:  300 mg


Guaifenesin (Robitussin Sf)  200 mg PO Q4H PRN


   PRN Reason: Cough


Labetalol HCl (Normodyne)  10 mg SLOW IVP Q4H PRN


   PRN Reason: SBP > 180 and HR >/= 70


Loperamide HCl (Imodium)  2 mg PO PRN PRN


   PRN Reason: Diarrhea/Loose Stools


Methocarbamol (Robaxin)  500 mg PO Q8HR [facility]


Metoclopramide HCl (Reglan)  5 mg PO DAILY [facility]


Metoclopramide HCl (Reglan)  5 mg PO 1000 [facility]


   Stop: 12/11/18 12:00


   Last Admin: 12/11/18 10:53 Dose:  5 mg


Metoprolol Tartrate (Lopressor)  50 mg PO BID [facility]


   Last Admin: 12/11/18 09:41 Dose:  50 mg


Mineral Oil/White Petrolatum (Eucerin Cream)  0 gm TOP BIDPRN PRN


   PRN Reason: Dry Skin


Morphine Sulfate (Ms Contin)  30 mg PO Q8H PRN


   PRN Reason: Severe Pain (7-10)


   Last Admin: 12/10/18 20:32 Dose:  30 mg


Ondansetron HCl (Zofran Odt)  4 mg PO Q6H PRN


   PRN Reason: Nausea/Vomiting


Ondansetron HCl (Zofran)  4 mg IVP Q6H PRN


   PRN Reason: Nausea/Vomiting


Potassium Chloride (Klor-Con 10)  10 meq PO DAILY [facility]


Rivaroxaban (Xarelto)  20 mg PO DAILY [facility]


   Last Admin: 12/11/18 09:41 Dose:  20 mg


Senna/Docusate Sodium (Senokot S)  2 tab PO BID PRN


   PRN Reason: Constipation


Sodium Chloride (Ocean Nasal Spray 0.65%)  0 ml EA NARE QIDPRN PRN


   PRN Reason: Nasal Congestion


Sodium Chloride (Flush - Normal Saline)  10 ml IVF Q12HR [facility]


   Last Admin: 12/11/18 09:44 Dose:  Not Given


Sodium Chloride (Flush - Normal Saline)  10 ml IVF PRN PRN


   PRN Reason: Saline Flush


Throat Lozenges (Cepastat Lozenges)  1 ramirez PO Q2H PRN


   PRN Reason: Sore Throat


Zolpidem Tartrate (Ambien)  5 mg PO HSPRN PRN


   PRN Reason: Insomnia

## 2018-12-12 RX ADMIN — Medication SCH ML: at 20:47

## 2018-12-12 RX ADMIN — Medication SCH ML: at 09:03

## 2018-12-12 NOTE — EKG
Test Reason : 

Blood Pressure : ***/*** mmHG

Vent. Rate : 114 BPM     Atrial Rate : 153 BPM

   P-R Int : 000 ms          QRS Dur : 068 ms

    QT Int : 278 ms       P-R-T Axes : 000 050 206 degrees

   QTc Int : 383 ms

 

Atrial fibrillation with rapid ventricular response with premature ventricular or aberrantly conducte
d complexes

Low voltage QRS

Nonspecific ST and T wave abnormality , probably digitalis effect

Abnormal ECG

 

Confirmed by KWAME ALFONSO, DUSTY ([id number]),  RUTH CARVALHO ([id number]) on 12/12/2018 3:28:34 PM

 

Referred By:             Confirmed By:DUSTY PUENTE MD

## 2018-12-12 NOTE — DIS
DATE OF ADMISSION:  12/10/2018



DATE OF DISCHARGE:  12/12/2018



PRIMARY CARE PHYSICIAN:  Lily Quispe MD.



DISCHARGE DISPOSITION:  Home.



PRIMARY DISCHARGE DIAGNOSES:  

1. Atrial fibrillation with rapid ventricular response, resolved.

2. Generalized weakness, improved.



SECONDARY DISCHARGE DIAGNOSES:  

1. Paroxysmal atrial fibrillation.

2. Right carotid stenosis.

3. Chronic low back pain.

4. Chronic pain disorder.

5. Gastroesophageal reflux disease.

6. Anemia, normocytic normochromic.

7. Chronic diastolic heart failure stage C.



PRIMARY PROCEDURE/OPERATION:  None.



RADIOLOGICAL INVESTIGATION:  CT brain showed hypodensity in right frontal

subcortical white matter.  Chest x-ray showed no acute cardiopulmonary process. 



LABORATORY DATA:  Significant labs; WBC 10.3, hemoglobin 9.0, platelets 617.  
Sodium

131, potassium 3.9, chloride 95, BUN 8, creatinine 0.61, calcium 8.4.  Cardiac

enzymes negative x3.  .1, LDL 81.  Urinalysis unremarkable. 



DISCHARGE MEDICATIONS:  

1. Aspirin 81 mg p.o. daily.

2. Lipitor 20 mg p.o. at bedtime.

3. Cardizem  mg p.o. daily.

4. Metoprolol 50 mg p.o. b.i.d.

5. Xarelto 20 mg daily.

6. Potassium chloride 10 mEq p.o. daily.

7. Omeprazole 20 mg p.o. daily.

8. Reglan 5 mg p.o. daily p.r.n.

9. Gabapentin 300 mg p.o. at bedtime.

10. Lasix 40 mg p.o. daily.

11. Morphine sulfate extended release 30 mg p.o. q.8 hourly.



CONTRAINDICATION:  None.



CODE STATUS:  Full code.



INPATIENT CONSULTANT:  Dr. Mcmahon and Dr. Pinon were following while in

hospital. 



TEST RESULTS PENDING ON DISCHARGE:  None.



DISCHARGE PLAN:  Posthospital, the patient will follow up with primary care

physician in one week. 



HOSPITAL COURSE:  An 86-year-old female, who has underlying history of 
paroxysmal

atrial fibrillation.  She was recently released from rehab as she was at home, 
and

she was experiencing generalized weakness, palpitation.  She was found with 
atrial

fibrillation with RVR in the emergency room.  She was treated with Cardizem 
drip.

Cardiology was consulted.  She was not taking any metoprolol medication, and 
during

this admission, we started metoprolol and Cardizem CD p.o.  She had a minor 
stroke

on the CT brain, but it was not new, that was old and chronic as per Neurology.

This patient was started on aspirin, and we also started Lipitor therapy.  She 
is up

to her baseline.  She is on chronic pain medication.  At this point, today her 
heart

rate is under control.  She is hemodynamically stable.  The patient is back to 
her

baseline level.  She will be able to go home today. 



I have seen and examined the patient bedside today.



REVIEW OF SYSTEMS:  All review of systems reviewed with her is negative.



PHYSICAL EXAMINATION:

VITAL SIGNS:  Currently, temperature 98.1, pulse 66, respiratory rate 22, 
saturation

94% on room air, blood pressure 118/58, weight 155 pounds. 

GENERAL:  The patient is currently alert, awake.  No obvious acute distress. 

HEENT:  Head; normocephalic, atraumatic.  Eyes; pupils round and reactive to 
light.

Extraocular muscles intact.  ENT, oropharynx within normal limits.  Moist mucous

membranes.  No oral lesion.  No pharyngeal erythema.  No exudate. 

NECK:  Supple.  No JVD.  No thyromegaly.  No carotid bruit. 

LUNGS:  Clear to auscultation without any rhonchi or rales. 

CARDIAC:  S1, S2 irregular.  No murmur.  No gallop.  No rub. 

ABDOMEN:  Soft, benign. 

EXTREMITIES:  No edema. 

NEUROLOGIC:  Nonfocal examination. 



Overall, the patient is medically stable for discharge today.







Job ID:  [id number]



MTDD

## 2018-12-12 NOTE — PDOC.PN
- Subjective


Encounter Start Date: 12/12/18


Encounter Start Time: 10:36





Patient seen and examined. No new complaints. No overnight events





pt is medically stable, family not comfortable to take care at home





- Objective


Resuscitation Status - Order Detail:





12/10/18 13:20


Resuscitation Status Routine 


   Resuscitation Status: FULL: Full Resuscitation








MAR Reviewed: Yes


Vital Signs & Weight: 


 Vital Signs (12 hours)











  Temp Pulse Pulse Resp BP BP Pulse Ox


 


 12/12/18 08:10    76   102/56 L  


 


 12/12/18 08:00  97.7 F  72   18   118/64 


 


 12/12/18 04:00  98.1 F  66   22 H   118/58 L  94 L


 


 12/12/18 00:00  97.7 F  67   18   106/56 L  93 L








 Weight











Weight                         155 lb 4 oz














I&O: 


 











 12/11/18 12/12/18 12/13/18





 06:59 06:59 06:59


 


Intake Total   360


 


Output Total 1200  


 


Balance -1200  360











Result Diagrams: 


 12/11/18 04:28





 12/11/18 04:28


EKG Reviewed by me: Yes





Phys Exam





- Physical Examination


Constitutional: NAD


HEENT: PERRLA, moist MMs, sclera anicteric


Neck: no JVD, supple


Respiratory: no wheezing, no rales, no rhonchi


Cardiovascular: no significant murmur, irregular


Gastrointestinal: soft, non-tender, no distention, positive bowel sounds


Musculoskeletal: no edema, pulses present


Neurological: non-focal, normal sensation, moves all 4 limbs


Psychiatric: normal affect, A&O x 3


Skin: no rash, normal turgor





Dx/Plan


(1) Weakness generalized


Code(s): R53.1 - WEAKNESS   Status: Acute   





(2) Atrial fibrillation


Code(s): I48.91 - UNSPECIFIED ATRIAL FIBRILLATION   Status: Acute   


Qualifiers: 


   Atrial fibrillation type: paroxysmal   Qualified Code(s): I48.0 - Paroxysmal 

atrial fibrillation   


Comment: with RVR, controlled rate    





(3) Carotid stenosis, right


Code(s): I65.21 - OCCLUSION AND STENOSIS OF RIGHT CAROTID ARTERY   Status: 

Chronic   





(4) Chronic low back pain


Code(s): M54.5 - LOW BACK PAIN; G89.29 - OTHER CHRONIC PAIN   Status: Chronic   





(5) GERD (gastroesophageal reflux disease)


Code(s): K21.9 - GASTRO-ESOPHAGEAL REFLUX DISEASE WITHOUT ESOPHAGITIS   Status: 

Chronic   


Qualifiers: 


   Esophagitis presence: without esophagitis   Qualified Code(s): K21.9 - Gastro

-esophageal reflux disease without esophagitis   





(6) Moderate mitral regurgitation


Code(s): I34.0 - NONRHEUMATIC MITRAL (VALVE) INSUFFICIENCY   Status: Chronic   





(7) Moderate tricuspid regurgitation


Code(s): I07.1 - RHEUMATIC TRICUSPID INSUFFICIENCY   Status: Chronic   





- Plan


cont current plan of care, 





* medication reviewed as below


* symptomatic treatment


* will dc metoprolol


*  for discharge plan.








Review of Systems





- Review of Systems


ENT: negative: Ear Pain, Ear Discharge, Nose Pain, Nose Discharge, Nose 

Congestion, Mouth Pain, Mouth Swelling, Throat Pain, Throat Swelling, Other


Respiratory: negative: Cough, Dry, Shortness of Breath, Hemoptysis, SOB with 

Excertion, Pleuritic Pain, Sputum, Wheezing


Cardiovascular: negative: chest pain, palpitations, orthopnea, paroxysmal 

nocturnal dyspnea, edema, light headedness, other


Gastrointestinal: negative: Nausea, Vomiting, Abdominal Pain, Diarrhea, 

Constipation, Melena, Hematochezia, Other


Genitourinary: negative: Dysuria, Frequency, Incontinence, Hematuria, Retention

, Other


Musculoskeletal: negative: Neck Pain, Shoulder Pain, Arm Pain, Back Pain, Hand 

Pain, Leg Pain, Foot Pain, Other





- Medications/Allergies


Allergies/Adverse Reactions: 


 Allergies











Allergy/AdvReac Type Severity Reaction Status Date / Time


 


No Known Drug Allergies Allergy   Verified 08/30/18 16:00











Medications: 


 Current Medications





Acetaminophen (Tylenol)  650 mg PO Q4H PRN


   PRN Reason: Headache/Fever/Mild Pain (1-3)


Hydrocodone Bitart/Acetaminophen (Norco 5/325)  1 tab PO Q4H PRN


   PRN Reason: Moderate Pain (4-6)


   Last Admin: 12/10/18 17:34 Dose:  1 tab


Artificial Tears (Tears Renewed 15ml Bottle)  2 drop EA EYE PRN PRN


   PRN Reason: Dry Eyes


Aspirin (Aspirin Chewable)  81 mg PO DAILY [facility]


   Last Admin: 12/12/18 09:01 Dose:  81 mg


Atorvastatin Calcium (Lipitor)  20 mg PO HS [facility]


   Last Admin: 12/11/18 21:17 Dose:  20 mg


Bisacodyl (Dulcolax)  10 mg VT DAILYPRN PRN


   PRN Reason: Constipation


Calcium Carbonate (Tums)  1,000 mg PO Q4H PRN


   PRN Reason: Heartburn  or Indigestion


Diltiazem HCl (Cardizem Cd)  180 mg PO DAILY [facility]


   Last Admin: 12/12/18 09:02 Dose:  180 mg


Famotidine (Pepcid)  20 mg PO BID [facility]


   Last Admin: 12/12/18 09:02 Dose:  20 mg


Furosemide (Lasix)  20 mg SLOW IVP 0600,1400 [facility]


   Last Admin: 12/12/18 06:30 Dose:  20 mg


Gabapentin (Neurontin)  300 mg PO HS [facility]


   Last Admin: 12/11/18 21:17 Dose:  300 mg


Guaifenesin (Robitussin Sf)  200 mg PO Q4H PRN


   PRN Reason: Cough


Labetalol HCl (Normodyne)  10 mg SLOW IVP Q4H PRN


   PRN Reason: SBP > 180 and HR >/= 70


Loperamide HCl (Imodium)  2 mg PO PRN PRN


   PRN Reason: Diarrhea/Loose Stools


Methocarbamol (Robaxin)  500 mg PO Q8H PRN


   PRN Reason: Muscle Spasm


Metoclopramide HCl (Reglan)  5 mg PO DAILY [facility]


   Last Admin: 12/12/18 09:02 Dose:  5 mg


Mineral Oil/White Petrolatum (Eucerin Cream)  0 gm TOP BIDPRN PRN


   PRN Reason: Dry Skin


Morphine Sulfate (Ms Contin)  30 mg PO Q8HR [facility]


   Last Admin: 12/12/18 06:30 Dose:  30 mg


Ondansetron HCl (Zofran Odt)  4 mg PO Q6H PRN


   PRN Reason: Nausea/Vomiting


Ondansetron HCl (Zofran)  4 mg IVP Q6H PRN


   PRN Reason: Nausea/Vomiting


Potassium Chloride (Klor-Con 10)  10 meq PO DAILY [facility]


   Last Admin: 12/12/18 09:02 Dose:  10 meq


Rivaroxaban (Xarelto)  20 mg PO DAILY [facility]


   Last Admin: 12/12/18 09:02 Dose:  20 mg


Senna/Docusate Sodium (Senokot S)  2 tab PO BID PRN


   PRN Reason: Constipation


Sodium Chloride (Ocean Nasal Spray 0.65%)  0 ml EA NARE QIDPRN PRN


   PRN Reason: Nasal Congestion


Sodium Chloride (Flush - Normal Saline)  10 ml IVF Q12HR [facility]


   Last Admin: 12/12/18 09:03 Dose:  10 ml


Sodium Chloride (Flush - Normal Saline)  10 ml IVF PRN PRN


   PRN Reason: Saline Flush


Throat Lozenges (Cepastat Lozenges)  1 ramirez PO Q2H PRN


   PRN Reason: Sore Throat


Zolpidem Tartrate (Ambien)  5 mg PO HSPRN PRN


   PRN Reason: Insomnia

## 2018-12-13 VITALS — DIASTOLIC BLOOD PRESSURE: 75 MMHG | TEMPERATURE: 97.4 F | SYSTOLIC BLOOD PRESSURE: 119 MMHG

## 2018-12-13 RX ADMIN — HYDROCODONE BITARTRATE AND ACETAMINOPHEN PRN TAB: 5; 325 TABLET ORAL at 08:20

## 2018-12-13 RX ADMIN — Medication SCH ML: at 08:22

## 2018-12-13 NOTE — PDOC.PN
- Subjective


Encounter Start Date: 12/13/18


Encounter Start Time: 07:00


-: old records requested/rev





Patient seen and examined. No new complaints. No overnight events





- Objective


Resuscitation Status - Order Detail:





12/10/18 13:20


Resuscitation Status Routine 


   Resuscitation Status: FULL: Full Resuscitation








MAR Reviewed: Yes


Vital Signs & Weight: 


 Vital Signs (12 hours)











  Temp Pulse Resp BP Pulse Ox


 


 12/13/18 08:20      92 L


 


 12/13/18 07:45  97.4 F L  80  20  119/75  92 L


 


 12/13/18 03:13  97.5 F L  66  18  117/61  92 L


 


 12/12/18 23:35  97.6 F  65  16  119/63  92 L








 Weight











Weight                         155 lb 4 oz














I&O: 


 











 12/12/18 12/13/18 12/14/18





 06:59 06:59 06:59


 


Intake Total  860 


 


Balance  860 











Result Diagrams: 


 12/11/18 04:28





 12/11/18 04:28


EKG Reviewed by me: Yes (afib)





Phys Exam





- Physical Examination


Constitutional: NAD


HEENT: PERRLA, moist MMs, sclera anicteric


Neck: no JVD, supple


Respiratory: no wheezing, no rales, no rhonchi


Cardiovascular: no significant murmur, irregular


Gastrointestinal: soft, non-tender, no distention, positive bowel sounds


Musculoskeletal: no edema, pulses present


Neurological: non-focal, normal sensation


Lymphatic: no nodes


Psychiatric: normal affect, A&O x 3


Skin: no rash, normal turgor





Dx/Plan


(1) Weakness generalized


Code(s): R53.1 - WEAKNESS   Status: Acute   





(2) Atrial fibrillation


Code(s): I48.91 - UNSPECIFIED ATRIAL FIBRILLATION   Status: Acute   


Qualifiers: 


   Atrial fibrillation type: paroxysmal   Qualified Code(s): I48.0 - Paroxysmal 

atrial fibrillation   


Comment: with RVR, controlled rate    





(3) Carotid stenosis, right


Code(s): I65.21 - OCCLUSION AND STENOSIS OF RIGHT CAROTID ARTERY   Status: 

Chronic   





(4) Chronic low back pain


Code(s): M54.5 - LOW BACK PAIN; G89.29 - OTHER CHRONIC PAIN   Status: Chronic   





(5) GERD (gastroesophageal reflux disease)


Code(s): K21.9 - GASTRO-ESOPHAGEAL REFLUX DISEASE WITHOUT ESOPHAGITIS   Status: 

Chronic   


Qualifiers: 


   Esophagitis presence: without esophagitis   Qualified Code(s): K21.9 - Gastro

-esophageal reflux disease without esophagitis   





(6) Moderate mitral regurgitation


Code(s): I34.0 - NONRHEUMATIC MITRAL (VALVE) INSUFFICIENCY   Status: Chronic   





(7) Moderate tricuspid regurgitation


Code(s): I07.1 - RHEUMATIC TRICUSPID INSUFFICIENCY   Status: Chronic   





- Plan


cont current plan of care, 





* medication reviewed as below


* symptomatic treatment


* see my discharge summery from yesterday.








Review of Systems





- Review of Systems


ENT: negative: Ear Pain, Ear Discharge, Nose Pain, Nose Discharge, Nose 

Congestion, Mouth Pain, Mouth Swelling, Throat Pain, Throat Swelling, Other


Respiratory: negative: Cough, Dry, Shortness of Breath, Hemoptysis, SOB with 

Excertion, Pleuritic Pain, Sputum, Wheezing


Cardiovascular: negative: chest pain, palpitations, orthopnea, paroxysmal 

nocturnal dyspnea, edema, light headedness, other


Gastrointestinal: negative: Nausea, Vomiting, Abdominal Pain, Diarrhea, 

Constipation, Melena, Hematochezia, Other


Genitourinary: negative: Dysuria, Frequency, Incontinence, Hematuria, Retention

, Other


Musculoskeletal: negative: Neck Pain, Shoulder Pain, Arm Pain, Back Pain, Hand 

Pain, Leg Pain, Foot Pain, Other





- Medications/Allergies


Allergies/Adverse Reactions: 


 Allergies











Allergy/AdvReac Type Severity Reaction Status Date / Time


 


No Known Drug Allergies Allergy   Verified 08/30/18 16:00

## 2018-12-13 NOTE — DIS
DATE OF ADMISSION:  12/10/2018



DATE OF DISCHARGE:  12/13/2018



ADDENDUM:  



PRIMARY CARE PHYSICIAN:  Dr. Lily Quispe. 



Please see my discharge summary dictated on December 12, 2018.  The patient's family

member was not comfortable to take care of her at home and that is why they

requested placement with help of , we arranged skilled nursing home in

[place].  Paperwork for discharge done.  Discharge medication reconciliation done.

The patient is medically stable for discharge.  The patient is seen and examined at

bedside today.  Please see my progress note from today for further details. 







Job ID:  [id number]

## 2018-12-21 ENCOUNTER — HOSPITAL ENCOUNTER (OUTPATIENT)
Dept: HOSPITAL 92 - RAD | Age: 83
Discharge: HOME | End: 2018-12-21
Attending: INTERNAL MEDICINE
Payer: MEDICARE

## 2018-12-21 DIAGNOSIS — R06.00: Primary | ICD-10-CM

## 2018-12-21 DIAGNOSIS — J90: ICD-10-CM

## 2018-12-21 DIAGNOSIS — R91.8: ICD-10-CM

## 2018-12-21 DIAGNOSIS — I51.7: ICD-10-CM

## 2018-12-21 DIAGNOSIS — Q25.46: ICD-10-CM

## 2018-12-21 PROCEDURE — 71046 X-RAY EXAM CHEST 2 VIEWS: CPT

## 2018-12-21 NOTE — RAD
PA AND LATERAL VIEWS CHEST:

 

Date:  12/21/18 

 

HISTORY:  

Dyspnea. 

 

FINDINGS/IMPRESSION: 

 

Comparison made with exam of 12/10/18. 

 

The heart is enlarged. The aorta is tortuous. Calcified nodes in the left hilar region are again seen
. There is a small right and moderate left pleural effusion with left basilar consolidation. No pneum
othoraces are seen. There are dorsal column stimulator leads in the lower thoracic spine. 

 

 

 

POS: OFF

## 2019-01-03 ENCOUNTER — HOSPITAL ENCOUNTER (INPATIENT)
Dept: HOSPITAL 92 - ERS | Age: 84
LOS: 11 days | Discharge: HOSPICE | DRG: 871 | End: 2019-01-14
Attending: INTERNAL MEDICINE | Admitting: INTERNAL MEDICINE
Payer: MEDICARE

## 2019-01-03 VITALS — BODY MASS INDEX: 26.6 KG/M2

## 2019-01-03 DIAGNOSIS — I08.1: ICD-10-CM

## 2019-01-03 DIAGNOSIS — A41.9: Primary | ICD-10-CM

## 2019-01-03 DIAGNOSIS — K21.9: ICD-10-CM

## 2019-01-03 DIAGNOSIS — Z79.01: ICD-10-CM

## 2019-01-03 DIAGNOSIS — Z51.5: ICD-10-CM

## 2019-01-03 DIAGNOSIS — I65.21: ICD-10-CM

## 2019-01-03 DIAGNOSIS — Z79.891: ICD-10-CM

## 2019-01-03 DIAGNOSIS — E87.8: ICD-10-CM

## 2019-01-03 DIAGNOSIS — T50.905A: ICD-10-CM

## 2019-01-03 DIAGNOSIS — L89.152: ICD-10-CM

## 2019-01-03 DIAGNOSIS — Z66: ICD-10-CM

## 2019-01-03 DIAGNOSIS — F41.8: ICD-10-CM

## 2019-01-03 DIAGNOSIS — M54.5: ICD-10-CM
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
## 2019-01-03 DIAGNOSIS — I11.0: ICD-10-CM

## 2019-01-03 DIAGNOSIS — Z79.82: ICD-10-CM

## 2019-01-03 DIAGNOSIS — E87.1: ICD-10-CM

## 2019-01-03 DIAGNOSIS — F03.90: ICD-10-CM

## 2019-01-03 DIAGNOSIS — G89.29: ICD-10-CM

## 2019-01-03 DIAGNOSIS — N13.39: ICD-10-CM

## 2019-01-03 DIAGNOSIS — R31.29: ICD-10-CM

## 2019-01-03 DIAGNOSIS — L27.0: ICD-10-CM

## 2019-01-03 DIAGNOSIS — I48.2: ICD-10-CM

## 2019-01-03 DIAGNOSIS — I50.33: ICD-10-CM

## 2019-01-03 DIAGNOSIS — J18.1: ICD-10-CM

## 2019-01-03 LAB
ALBUMIN SERPL BCG-MCNC: 3 G/DL (ref 3.4–4.8)
ALP SERPL-CCNC: 108 U/L (ref 40–150)
ALT SERPL W P-5'-P-CCNC: (no result) U/L (ref 8–55)
ANALYZER IN CARDIO: (no result)
ANION GAP SERPL CALC-SCNC: 15 MMOL/L (ref 10–20)
AST SERPL-CCNC: 11 U/L (ref 5–34)
BASE EXCESS STD BLDA CALC-SCNC: -1.3 MEQ/L
BASOPHILS # BLD AUTO: 0 THOU/UL (ref 0–0.2)
BASOPHILS NFR BLD AUTO: 0.2 % (ref 0–1)
BILIRUB SERPL-MCNC: 0.6 MG/DL (ref 0.2–1.2)
BUN SERPL-MCNC: 19 MG/DL (ref 9.8–20.1)
CA-I BLDA-SCNC: 1.09 MMOL/L (ref 1.12–1.3)
CALCIUM SERPL-MCNC: 8.9 MG/DL (ref 7.8–10.44)
CHLORIDE SERPL-SCNC: 92 MMOL/L (ref 98–107)
CO2 SERPL-SCNC: 26 MMOL/L (ref 23–31)
CREAT CL PREDICTED SERPL C-G-VRATE: 0 ML/MIN (ref 70–130)
CRYSTALS URNS QL MICRO: (no result) HPF
EOSINOPHIL # BLD AUTO: 0 THOU/UL (ref 0–0.7)
EOSINOPHIL NFR BLD AUTO: 0.1 % (ref 0–10)
GLOBULIN SER CALC-MCNC: 3.4 G/DL (ref 2.4–3.5)
GLUCOSE SERPL-MCNC: 154 MG/DL (ref 83–110)
HCO3 BLDA-SCNC: 22.4 MEQ/L (ref 22–28)
HCT VFR BLDA CALC: 30 % (ref 36–47)
HEV IGM SER QL: 1.1 (ref 0–7.99)
HGB BLD-MCNC: 9.7 G/DL (ref 12–16)
HGB BLDA-MCNC: 10.3 G/DL (ref 12–16)
HYALINE CASTS #/AREA URNS LPF: (no result) LPF
LYMPHOCYTES # BLD: 0.9 THOU/UL (ref 1.2–3.4)
LYMPHOCYTES NFR BLD AUTO: 5.6 % (ref 21–51)
MCH RBC QN AUTO: 26 PG (ref 27–31)
MCV RBC AUTO: 83.1 FL (ref 78–98)
MONOCYTES # BLD AUTO: 1.4 THOU/UL (ref 0.11–0.59)
MONOCYTES NFR BLD AUTO: 9.2 % (ref 0–10)
NEUTROPHILS # BLD AUTO: 12.9 THOU/UL (ref 1.4–6.5)
NEUTROPHILS NFR BLD AUTO: 84.9 % (ref 42–75)
PATHC CAST-AUWI FLAG: 0.29 (ref 0–2.49)
PCO2 BLDA: 34 MMHG (ref 35–45)
PH BLDA: 7.44 [PH] (ref 7.35–7.45)
PLATELET # BLD AUTO: 345 THOU/UL (ref 130–400)
PO2 BLDA: 76 MMHG (ref 60–?)
POTASSIUM BLD-SCNC: 4.1 MMOL/L (ref 3.7–5.3)
POTASSIUM SERPL-SCNC: 4.6 MMOL/L (ref 3.5–5.1)
PROT UR STRIP.AUTO-MCNC: 30 MG/DL
RBC # BLD AUTO: 3.75 MILL/UL (ref 4.2–5.4)
SODIUM SERPL-SCNC: 128 MMOL/L (ref 136–145)
SP GR UR STRIP: 1.01 (ref 1–1.04)
SPECIMEN DRAWN FROM PATIENT: (no result)
SPERM-AUWI FLAG: 0 (ref 0–9.9)
WBC # BLD AUTO: 15.1 THOU/UL (ref 4.8–10.8)
WBC UR QL AUTO: (no result) HPF (ref 0–3)
YEAST-AUWI FLAG: 0 (ref 0–25)

## 2019-01-03 PROCEDURE — 71045 X-RAY EXAM CHEST 1 VIEW: CPT

## 2019-01-03 PROCEDURE — 87804 INFLUENZA ASSAY W/OPTIC: CPT

## 2019-01-03 PROCEDURE — 94640 AIRWAY INHALATION TREATMENT: CPT

## 2019-01-03 PROCEDURE — 83880 ASSAY OF NATRIURETIC PEPTIDE: CPT

## 2019-01-03 PROCEDURE — 84484 ASSAY OF TROPONIN QUANT: CPT

## 2019-01-03 PROCEDURE — 96375 TX/PRO/DX INJ NEW DRUG ADDON: CPT

## 2019-01-03 PROCEDURE — 82274 ASSAY TEST FOR BLOOD FECAL: CPT

## 2019-01-03 PROCEDURE — 74018 RADEX ABDOMEN 1 VIEW: CPT

## 2019-01-03 PROCEDURE — 87040 BLOOD CULTURE FOR BACTERIA: CPT

## 2019-01-03 PROCEDURE — 80202 ASSAY OF VANCOMYCIN: CPT

## 2019-01-03 PROCEDURE — A4353 INTERMITTENT URINARY CATH: HCPCS

## 2019-01-03 PROCEDURE — 36415 COLL VENOUS BLD VENIPUNCTURE: CPT

## 2019-01-03 PROCEDURE — 93306 TTE W/DOPPLER COMPLETE: CPT

## 2019-01-03 PROCEDURE — 81015 MICROSCOPIC EXAM OF URINE: CPT

## 2019-01-03 PROCEDURE — 80053 COMPREHEN METABOLIC PANEL: CPT

## 2019-01-03 PROCEDURE — 85025 COMPLETE CBC W/AUTO DIFF WBC: CPT

## 2019-01-03 PROCEDURE — 71260 CT THORAX DX C+: CPT

## 2019-01-03 PROCEDURE — 82805 BLOOD GASES W/O2 SATURATION: CPT

## 2019-01-03 PROCEDURE — 96365 THER/PROPH/DIAG IV INF INIT: CPT

## 2019-01-03 PROCEDURE — 74177 CT ABD & PELVIS W/CONTRAST: CPT

## 2019-01-03 PROCEDURE — 81003 URINALYSIS AUTO W/O SCOPE: CPT

## 2019-01-03 PROCEDURE — 83605 ASSAY OF LACTIC ACID: CPT

## 2019-01-03 PROCEDURE — 93798 PHYS/QHP OP CAR RHAB W/ECG: CPT

## 2019-01-03 PROCEDURE — 80048 BASIC METABOLIC PNL TOTAL CA: CPT

## 2019-01-03 PROCEDURE — 96361 HYDRATE IV INFUSION ADD-ON: CPT

## 2019-01-03 PROCEDURE — C9113 INJ PANTOPRAZOLE SODIUM, VIA: HCPCS

## 2019-01-03 PROCEDURE — 87086 URINE CULTURE/COLONY COUNT: CPT

## 2019-01-03 PROCEDURE — 93005 ELECTROCARDIOGRAM TRACING: CPT

## 2019-01-03 NOTE — RAD
FRONTAL VIEW CHEST:

 

Date:  01/03/19

 

COMPARISON:  

2 view chest dated 12/21/18. 

 

INDICATION:

Dyspnea. 

 

FINDINGS:

There is a moderate left effusion with adjacent parenchymal density of the mid to inferior left lung.
 Right lung is grossly clear. Cardiac silhouette and pulmonary vasculature are prominent. Added densi
ty of the mediastinum likely relates to calcified lymph nodes. 

 

IMPRESSION: 

1.  Moderate left effusion with adjacent atelectasis and/or pneumonia. 

2.  CHF. 

 

 

POS: [facility]

## 2019-01-03 NOTE — HP
PRIMARY CARE PHYSICIAN:  Tessy Li MD.



CHIEF COMPLAINT:  Respiratory distress.



HISTORY OF PRESENT ILLNESS:  Ms. Mckeon is a pleasant 86-year-old lady who was

seen at [facility] on January 3, 2019, following transfer

from [facility]. 



The patient is unable to provide any significant history.  The patient's  was

in the room as well, and he was unable to provide any significant history either.  I

tried calling the patient's daughter, but the call was not answered.  Collateral

history was obtained from review of medical records and discussion with emergency

room physician. 



Ms. Mckeon was hospitalized at [facility] from  to  of last year for atrial fibrillation with rapid ventricular response.

She was subsequently transferred for skilled nursing at [facility]. 



The patient's  reports that about 2 or 3 days ago, there was concern about

hematuria and blood in the stool.  He reports that a test may have been sent from

the nursing home.  He does not know the result of the test. 



EMS was called earlier today to [facility] because the patient had

cough and respiratory distress.  Her oxygen saturation was reported in the 70s and

80s on a non-rebreather mask.  The patient denied any chest pain.  She tells me that

she has pneumonia, but is unable to elaborate regarding complaints. 



REVIEW OF SYSTEMS:  All other systems reviewed and found to be negative.



PAST MEDICAL HISTORY:  Chronic atrial fibrillation, chronic low back pain,

gastroesophageal reflux disease, moderate mitral and tricuspid regurgitation, right

carotid stenosis. 



PAST SURGICAL HISTORY:  Back surgery x6, right foot surgery, bilateral hand surgery,

left and right ankle surgery, left shoulder surgery x2, appendectomy, hysterectomy,

tonsillectomy, and spinal cord pain stimulator. 



PSYCHIATRIC HISTORY:  Anxiety and depression.



SOCIAL HISTORY:  The patient is currently at [facility] for skilled

nursing.  She denies any history of tobacco use, alcohol use, or recreational drug

use. 



FAMILY HISTORY:  Mother  from congestive heart failure in her 80s.  Father 

from congestive heart failure in his 80s as well. 



ALLERGIES:  NO KNOWN DRUG ALLERGIES.



CURRENT MEDICATIONS:  

1. Morphine 30 mg every 8 hours as needed.

2. Aspirin 325 mg daily.

3. Lasix 40 mg daily.

4. Methocarbamol 750 mg daily.

5. Metoclopramide 5 mg daily.

6. Gabapentin 300 mg daily.

7. Rivaroxaban 20 mg daily.

8. Norco p.r.n.



CODE STATUS:  I discussed her code status.  She is full code.



PHYSICAL EXAMINATION:

GENERAL:  On examination, Ms. Mckeon is sleepy, but arousable, not in acute

distress. 

VITAL SIGNS:  Blood pressure is 101/52, pulse 69, respiratory rate 18, and oxygen

saturation 98% on 2 L of oxygen.  She is afebrile. 

EYES:  No scleral icterus.  No conjunctival pallor. 

ENT:  Moist mucosal membranes.  No oropharyngeal erythema or exudates. 

NECK:  Supple, nontender, trachea is midline. 

RESPIRATORY:  Accessory muscles of breathing are not active.  Chest wall movements

are symmetric bilaterally.  Lung examination reveals left basal crackles. 

CARDIOVASCULAR:  S1 and S2 are heard, irregular.  Peripheral pulses palpable.  No

pericardial rub. 

ABDOMEN:  Soft, nontender, no hepatomegaly, no splenomegaly. 

NEUROLOGIC:  Cranial nerves 2 through 12 intact.  Deep tendon reflexes 2+. 

SKIN:  No rashes or subcutaneous nodules. 

MUSCULOSKELETAL:  Power is 5/5 in all 4 extremities. 

LYMPHATIC:  No cervical lymphadenopathy. 

PSYCHIATRIC:  The patient appears tired, oriented to person only, not to place or

time. 



LABORATORY DATA:  Ms. Mckeon's labs and investigations were reviewed.  I reviewed

her electrocardiogram, which shows atrial fibrillation. Electrocardiogram does have

electrical artifact.  I also reviewed her chest x-ray, which shows left pleural

effusion as well as left lower lobe infiltrate.  She has elevated white count of

15,100 of which 84.9% are neutrophils, normocytic anemia with hemoglobin of 9.7,

last known hemoglobin 9.0 on 2018, normal platelet count.  Decreased

sodium of 128, normal potassium, normal creatinine.  Decreased albumin of 3.0,

otherwise unremarkable liver profile and normal lactic acid level of 1.7.  Arterial

blood gases show pH 7.44, pCO2 of 34, and pO2 of 76 on 2 L of oxygen. 



ASSESSMENT AND PLAN:  Ms. Mckeon is a pleasant 86-year-old lady, who was seen at

[facility] on January 3, 2019.  Her problem list includes: 

1. Sepsis:  Ms. Mckeon has leukocytosis.  She also had respiratory rate of 22

when she presented to the emergency room.  She meets the criteria for sepsis,

suspected source of infection in the lung.  She will be admitted to the hospital for

further management.  She has already received antibiotics, which I will continue. 

2. Pneumonia:  She has left lower lobe pneumonia.  She has received vancomycin and

Zosyn, which I will continue.  Her oxygenation has improved since coming to the

emergency room.  We will continue to monitor. 

3. Atrial fibrillation:  We will resume Xarelto once confirmed.  We will also

request stool occult blood test as well as urinalysis, given family's concern about

probable recent hematuria or gastrointestinal bleed. 

4. Chronic pain:  We will continue the patient's home medications once clarified.

5. Gastroesophageal reflux disease:  Appears to be stable. 

Many thanks for allowing me to participate in your patient's care.  Please feel free

to contact me with any questions or concerns. 



LEVEL OF RISK:  High.



LEVEL OF COMPLEXITY:  High.







Job ID:  [id number]

## 2019-01-04 LAB
ANION GAP SERPL CALC-SCNC: 15 MMOL/L (ref 10–20)
BASOPHILS # BLD AUTO: 0 THOU/UL (ref 0–0.2)
BASOPHILS NFR BLD AUTO: 0.4 % (ref 0–1)
BUN SERPL-MCNC: 16 MG/DL (ref 9.8–20.1)
CALCIUM SERPL-MCNC: 8.6 MG/DL (ref 7.8–10.44)
CHLORIDE SERPL-SCNC: 94 MMOL/L (ref 98–107)
CO2 SERPL-SCNC: 22 MMOL/L (ref 23–31)
CREAT CL PREDICTED SERPL C-G-VRATE: 71 ML/MIN (ref 70–130)
EOSINOPHIL # BLD AUTO: 0 THOU/UL (ref 0–0.7)
EOSINOPHIL NFR BLD AUTO: 0 % (ref 0–10)
GLUCOSE SERPL-MCNC: 177 MG/DL (ref 83–110)
HGB BLD-MCNC: 9.4 G/DL (ref 12–16)
LYMPHOCYTES # BLD: 0.5 THOU/UL (ref 1.2–3.4)
LYMPHOCYTES NFR BLD AUTO: 6.8 % (ref 21–51)
MCH RBC QN AUTO: 26.4 PG (ref 27–31)
MCV RBC AUTO: 82.9 FL (ref 78–98)
MONOCYTES # BLD AUTO: 0.1 THOU/UL (ref 0.11–0.59)
MONOCYTES NFR BLD AUTO: 1.9 % (ref 0–10)
NEUTROPHILS # BLD AUTO: 6.4 THOU/UL (ref 1.4–6.5)
NEUTROPHILS NFR BLD AUTO: 90.9 % (ref 42–75)
PLATELET # BLD AUTO: 329 THOU/UL (ref 130–400)
POTASSIUM SERPL-SCNC: 4.1 MMOL/L (ref 3.5–5.1)
RBC # BLD AUTO: 3.54 MILL/UL (ref 4.2–5.4)
SODIUM SERPL-SCNC: 127 MMOL/L (ref 136–145)
WBC # BLD AUTO: 7.1 THOU/UL (ref 4.8–10.8)

## 2019-01-04 RX ADMIN — POTASSIUM CHLORIDE AND SODIUM CHLORIDE SCH MLS: 450; 150 INJECTION, SOLUTION INTRAVENOUS at 15:29

## 2019-01-04 RX ADMIN — ONDANSETRON PRN MG: 2 INJECTION INTRAMUSCULAR; INTRAVENOUS at 19:53

## 2019-01-04 RX ADMIN — ONDANSETRON PRN MG: 2 INJECTION INTRAMUSCULAR; INTRAVENOUS at 11:31

## 2019-01-04 NOTE — PQF
SANTIAGO NOLASCO ZBIGNIEW A MD

[id number]                                                             [facility]

[id number]                             

                                   

CLINICAL DOCUMENTATION IMPROVEMENT CLARIFICATION FORM:  ICD-10 Updated



PLEASE DO AN ADDENDUM TO THE PROGRESS NOTE WITH ANY DOCUMENTATION UPDATES OR 
ADDITIONS AND CARRY THROUGH TO DC SUMMARY.   THANK YOU.



DATE:  1/4, 7                                                               ATTN
:  DR. CHELSEA VALDEZ



Please exercise your independent, professional judgment in responding to the 
clarification form. Clinical indicators are provided on the bottom of this form 
for your review.



Please check appropriate box(s):



_______   I (concur)  with the Nursing Admission Skin Assessment findings as 
stated below.



[  ] Pressure Ulcer: (Stage I: Erythema; Stage II: Partial thickness; Stage III
: Full thickness; Stage IV: Necrosis to muscle/bone)

   [  ] Location: ___________________________________POA: [  ] Yes [  ] No[  ] 
Unable to determine

   Stage (I to IV): ____II___ (Left_____   Right_____ Bilateral_____ N/A_____)

   [  ] Location: ___________________________________POA: [  ] Yes [  ] No[  ] 
Unable to determine

   Stage (I to IV): ____II___ (Left_____   Right_____ Bilateral_____ N/A_____)

   [  ] Location: _______SACRALCOCCYX____________________________POA: [  ] Yes 
[  ] No[  ] Unable to determine

   Stage (I to IV): _______ (Left_____   Right_____ Bilateral_____ N/A__II___)

[  ] No pressure ulcer diagnosis



[  ] Other diagnosis ___________

[  ] Unable to determine



In addition, please specify:

Present on Admission (POA):  [  X] Yes             [  ] No             [  ] 
Unable to determine



For continuity of documentation, please document condition throughout progress 
notes and discharge summary.  Thank You.



CLINICAL INDICATORS - SIGNS / SYMPTOMS / LABS



NURSING ADM SKIN ASSMT:   STAGE II PRESSURE ULCER L HEEL,          

         STAGE II PRESSURE ULCER R HEEL, 

       STAGE II PRESSURE ULCER SACROCOCCYGEAL



RISK FACTORS:

ADVANCED AGE (86)

SEPSIS



TREATMENTS:

WAFFLE MATTRESS

TURN Q2 HRS

FLOAT HEELS 





THANK YOU!  Radha



(This form is maintained as a part of the permanent medical record)

 2015 [employer].  All Rights Reserved

Radha Song RN, BSN    [email or web address]    Office:  [phone]



[facility]

## 2019-01-04 NOTE — PRG
DATE OF SERVICE:  01/04/2019



SUBJECTIVE:  The patient is seen and examined at the bedside.  She complains about

abdominal pain.  She had some nausea.  She did not vomit.  She had bowel movements

yesterday, but she cannot eat anything because of the abdominal pain, which is

continuous. 



OBJECTIVE:  VITAL SIGNS:  Blood pressure is 116/55, pulse is 93, temperature 97.6,

respiration is 18, and O2 saturation is 92% on 2 L by nasal cannula. 

HEENT:  Her head is atraumatic and normocephalic.  She has facial droop.  Her oral

mucosa is slightly dry. 

NECK:  Supple. 

LUNGS:  Breath sounds diminished at the left base.  Few crackles at the left base.

No wheezing. 

HEART:  S1 and S2, normal.  No S3.  No S4. 

ABDOMEN:  Mildly distended.  Tender to palpation all over.  No guarding.  No masses. 

EXTREMITIES:  No clubbing or cyanosis.  There is 1+ peripheral edema similar

bilaterally on below-the-knee, both lower extremities. 

NEUROLOGICAL:  She follows my commands.  She is able to answer my questions

properly.  She moves her all four extremities. 



LABORATORY DATA:  Labs showed white count of 7.1, hemoglobin 9.4, hematocrit 29.4,

platelet count 329,000.  Sodium of 127, potassium 4.1, chloride 94, CO2 of 22,

glucose 177, calcium 8.6.  Microbiology:  Occult blood on her stool is negative.

Blood cultures no growth.  Influenza type A and B, direct EIA, final negative. 



IMPRESSION:  

1. Sepsis.  Her leukocytosis is down.  Cultures on her blood came back negative,

preliminary. 

2. She is treated as HCAP with vancomycin and Zosyn.

3. Abdominal pain of unclear etiology.

4. Atrial fibrillation with controlled ventricular rate.

5. Hematuria.  Xarelto is on hold.  Aspirin is on hold.  We will obtain Urology

consultation with Dr. Bailey, who is on call today. 

6. Chronic pain.  We will continue her on her morphine.

7. Hyponatremia, hypochloremia.  We will give her some normal saline today 100 mL/h

and hold her diuretic, and we will obtain CT of the chest and abdomen with and

without contrast for further diagnostic workup.  We will continue both antibiotics. 







Job ID:  [id number]

## 2019-01-04 NOTE — CT
CHEST CT WITH CONTRAST

ABDOMEN CT WITH CONTRAST

PELVIC CT WITH CONTRAST

1/4/19

 

COMPARISON:  

CT abdomen and pelvis 8/14/13, CT angiogram of the chest 4/11/18.

 

HISTORY: 

Sepsis. Weakness. Pain. 

 

FINDINGS:  

 

CHEST CT:

There is no mediastinal mass, lymphadenopathy or hematoma. Heart is enlarged. There is a small amount
 of pericardial fluid. The thoracic aorta and abdominal aorta have a normal caliber. No periaortic fa
t stranding. There are bilateral moderate pleural effusions with adjacent lung parenchymal consolidat
ion likely due to atelectasis. Pneumonia or aspiration cannot be excluded. There is fluid along the l
eft major fissure.  Patchy ground glass opacities in the left and right lung apex. Chronic changes in
 the left lung apex are noted. Trachea and central bronchi are patent. Stable calcified lymph node in
 the prevascular space. Note is made of a moderate hiatal hernia. 

 

ABDOMEN AND PELVIS CT:

Unremarkable gallbladder bladder. Portal vein is patent. The liver, spleen, pancreas, and adrenal gla
nds have appropriate enhancement. Note, there is significant pancreatic atrophy. There is symmetric e
nhancement of the kidneys. There is an exophytic cyst emanating from the lower pole of the left kidne
y measuring 6.0 x 5.6 cm with attenuation coefficient of 12.4 Hounsfield units. No evidence of left s
ided obstructive uropathy. There is moderate to severe right sided obstructive uropathy with dilatati
on of the right intrarenal collecting system, right pelvis, and dilatation of proximal right ureter. 
There are no obstructing calculi or masses appreciated. Retrograde IVP may be beneficial. 

 

There is no gastrohepatic, retrocrural or periportal lymphadenopathy. There is no mesenteric mass, ly
mphadenopathy, free air, or free fluid. Gastric mucosa is unremarkable. Multiple normal caliber small
 bowel loops. Ileocecal junction is normal. There is a copious amount of fecal material throughout th
e entire colon. No evidence of a distal obstructing mass. Correlate clinically for constipation. 

 

CT PELVIS: 

No mass, lymphadenopathy, free air or free fluid. Unremarkable urinary bladder. There are postsurgica
l changes in the lumbar spine. 

 

Dorsal column stimulator is noted. 

 

IMPRESSION:  

1.      Moderate bilateral effusion with adjacent consolidation due to atelectasis, pneumonia, or asp
iration. 

2.      Severe right sided hydronephrosis and proximal hydroureter without associated obstructing ure
teral calculus. Retrograde IVP is recommended. 

3.      Copious fecal material throughout the entire colon. Correlate clinically for constipation. 

 

Results of the study discussed with Madie, patient's nurse, 1/4/19 at 2:48 p.m.

 

Code BARBY 

 

 

POS: KATHIE

## 2019-01-05 LAB — VANCOMYCIN TROUGH SERPL-MCNC: 9.3 UG/ML

## 2019-01-05 RX ADMIN — ONDANSETRON PRN MG: 2 INJECTION INTRAMUSCULAR; INTRAVENOUS at 09:43

## 2019-01-05 RX ADMIN — POTASSIUM CHLORIDE AND SODIUM CHLORIDE SCH MLS: 450; 150 INJECTION, SOLUTION INTRAVENOUS at 09:52

## 2019-01-05 NOTE — CON
DATE OF CONSULTATION:  01/04/2019



HISTORY OF PRESENT ILLNESS:  This is an 86-year-old female, who was transferred here

from [facility] yesterday with pneumonia.  I was asked to see her

because of hematuria.  On talking with the nurses, they stated that the urine has

been discolored and she has been voiding in a diaper.  Looking at her urinalysis,

she has a 11 to 20 red cells, 2+ crystals, 0 to 3 white cells, no bacteria.  Culture

was not done.  She did have cultures done of her stool and her blood, which have

come back negative.  She was felt to have a pneumonia based on her chest x-ray.  She

did end up having a CAT scan done today because she was complaining of abdominal

pain and this does show a CAT scan.  A right hydronephrotic kidney and that is

fairly significant, renal pelvis dilatation that was mild and some caliectasis.  The

renal cortex is well maintained and it was actually contrast.  Getting into the

renal cortex, it looks like it maybe in the proximal ureter/UPJ region.  Her

creatinine is normal at 0.7, or the kidney looks normal apart from the cyst.  She

had a mildly elevated white count of 15.1 when she came in.  Yesterday and today, it

is 7.1.  She is piperacillin/tazobactam and vancomycin for pneumonia.  She is also

on oxybutynin and this I believe the medicine she came in with.  She takes chronic

morphine for back pain.  While talking with the nurse today, she says that she is

unsure if she has actually had gross hematuria.  She has seen one diaper that showed

discolored urine and she is not sure about her abdominal pain that may just be back

pain that she has been having.  The patient really cannot give any history and there

is no one here to help with her history.  Reviewed her history and physical by Dr. Walker and it looks like he got most of it from prior charts, but she does have

chronic atrial fib, chronic low back pain, reflux disease, some mitral tricuspid

regurgitation, carotid stenosis.  She has had back surgery six times.  She has had

right foot surgery.  She has had hand surgery.  She has had ankle surgery on both

sides and left shoulder surgery twice, appendectomy, hysterectomy, tonsillectomy.

She had a spinal cord stimulator in. 



ALLERGIES:  SHE HAS NO KNOWN DRUG ALLERGIES.



MEDICATIONS:  Current medications were morphine, aspirin, Lasix, gabapentin, and

Norco p.r.n. 



PHYSICAL EXAMINATION:

GENERAL:  On examination, she does not have flank tenderness. 

ABDOMEN:  Soft and nontender on exam right now.  There is no rebound.  There is no

guarding. 



IMPRESSION:  Possible hematuria, which is microscopic __________ most although I am

going to have the nurse to do in- and out catheterization and get a urinalysis from

that and we will see what that shows.  She does have this right hydro, which my

guess is probably not an acute process, but probably a chronic process and is

probably non obstructive.  I will look possibly doing a Lasix renal scan on her

maybe on Monday or Tuesday.  I do not know that will rush in at her age and with her

mental status to be doing a retrograde on her at this time short of something

changing clinically. 







Job ID:  [id number]

## 2019-01-05 NOTE — PRG
DATE OF SERVICE:  01/05/2019



SUBJECTIVE:  The patient is seen and examined at bedside.  The patient had two

family members present in the room during my visit.  She still has some abdominal

pain and some nausea, on and off.  She was able to eat some breakfast today, but she

vomited last night. 



OBJECTIVE:  VITAL SIGNS:  Blood pressure is 107/56, pulse is 99, temperature 97.7,

respiratory rate is 20, O2 saturation is 94% on 2 L by nasal cannula. 

HEENT:  Head is atraumatic and normocephalic.  Pupils are responding to light

properly.  Sclerae nonicteric.  Conjunctivae palish.  Oral mucosa is moist. 

NECK:  Supple. 

LUNGS:  Breath sounds diminished significantly at both bases. 

HEART:  S1 and S2, irregularly irregular.  No S3.  No S4. 

ABDOMEN:  Soft, although tender on deeper palpation in the epigastric area on both

sides and in the center.  Bowel sounds are present.  No organomegaly. 

EXTREMITIES:  No clubbing, cyanosis, or edema. 

NEUROLOGICAL:  She follows my commands.  She moves her all four extremities.  There

is no any motor deficit. 



LABORATORY DATA:  Fecal occult blood test is negative.  Blood cultures negative x2.



IMPRESSION:  

1. Sepsis with improved leukocytosis and negative blood cultures so far.  We will

switch her to levofloxacin. 

2. HCAP switched to levofloxacin today.

3. Bilateral pleural effusion, most likely congestive heart failure.  We will stop

IV fluids.  Start her on IV Lasix 40 every 12 hours.  We will get cardiology consult

and echocardiogram. 

4. Atrial fibrillation with controlled ventricular rate.  Her antiplatelets were

stopped because of the bleeding.  Urology saw the patient.  We are going to obtain a

cathed urine specimen and check for hematuria. 

5. Right hydronephrosis, suspected to be chronic, because her kidney function is

still quite good. 

6. Abdominal pain with some nausea and vomiting.  I suspect that maybe she has PUD.

I am going to start her on Protonix 40 mg IV piggyback every 24 hours. 

7. Hyponatremia and hypochloremia.  We will check her BMP this morning.

8. Anorexia, most likely related to her abdominal pain and nausea.



PLAN:  Plan is to switch her to IV levofloxacin.  Start her on Protonix.  Obtain

echo, Cardiology consultation.  Start IV Lasix.  Stop IV fluids.  Obtain urine

specimen by catheterization and check for hematuria. 







Job ID:  [id number]

## 2019-01-06 LAB
ANION GAP SERPL CALC-SCNC: 13 MMOL/L (ref 10–20)
BUN SERPL-MCNC: 9 MG/DL (ref 9.8–20.1)
CALCIUM SERPL-MCNC: 8.3 MG/DL (ref 7.8–10.44)
CHLORIDE SERPL-SCNC: 93 MMOL/L (ref 98–107)
CO2 SERPL-SCNC: 29 MMOL/L (ref 23–31)
CREAT CL PREDICTED SERPL C-G-VRATE: 70 ML/MIN (ref 70–130)
GLUCOSE SERPL-MCNC: 121 MG/DL (ref 83–110)
POTASSIUM SERPL-SCNC: 3.8 MMOL/L (ref 3.5–5.1)
SODIUM SERPL-SCNC: 131 MMOL/L (ref 136–145)

## 2019-01-06 NOTE — PRG
DATE OF SERVICE:  01/06/2019



SUBJECTIVE:  The patient is seen and examined at the bedside.  She is feeling

better.  She ate breakfast.  Her abdominal pain improved. 



OBJECTIVE:  VITAL SIGNS:  Blood pressure is 109/71, pulse is 90, respirations 18, O2

saturation is 93% on 2 L by nasal cannula, temperature is 97.7. 

HEENT:  Head is atraumatic and normocephalic.  Eyes are PERRLA.  Sclerae are

nonicteric.  Oral mucosa is somewhat dry. 

NECK:  Supple. 

LUNGS:  Breath sounds diminished at both bases with some evidence of pleural

effusion bilaterally and dullness on percussion in the lower parts of both lungs. 

HEART:  S1 and S2.  Irregularly irregular.  No S3.  No S4. 

ABDOMEN:  Soft, nontender.  Bowel sounds are present.  No organomegaly. 

EXTREMITIES:  No clubbing, cyanosis, or edema. 

NEUROLOGIC:  She answers my questions quite properly, although there is some mental

disturbance.  She moves her all 4 extremities.  There is no any motor deficits. 



LABORATORY DATA:  None today.



IMPRESSION:  

1. Pneumonia, which is treated as healthcare associated, on levofloxacin to continue.

2. Sepsis with improved leukocytosis.  Negative blood cultures.

3. Bilateral pleural effusion.  The patient was seen by cardiologist, Dr. Daniel, who

thinks that her bilateral pleural effusion is related to her pneumonia. 

4. Atrial fibrillation with controlled ventricular rate.

5. Right hydronephrosis, suspected to be chronic since her kidney function is quite

good.  We are not planning to do any heroic measures with her. 

6. Abdominal pain with nausea and vomiting, resolved on Protonix.  We will switch

her to p.o. daily dose of 40 mg of Protonix. 

7. Hyponatremia and hypochloremia.  We will check BMP today, but I think it is 

related to her fluid overload and possible congestive heart failure.

8. Anorexia, improved.



PLAN:  Plan is to switch her to p.o. Protonix.  Continue levofloxacin.  Continue PT.

 Continue Lasix. 







Job ID:  [id number]

## 2019-01-06 NOTE — CON
DATE OF CONSULTATION:  01/05/2019



INDICATION FOR CONSULTATION:  An 86-year-old female who was admitted with pneumonia,

confusion, hematuria, and has a history of atrial fibrillation.  We are asked to see

her due to the atrial fibrillation.  She has chronic atrial fibrillation.  She has

been followed by Dr. Pinon in the past.  She has had no complaints.  However, the

lady is demented and very little history can be obtained from the patient.  At this

time, she is relatively comfortable.  Her EKGs from the chart and from the monitor

show that she is in atrial fibrillation, but the rate has been well controlled.  I

do not see any significant tachycardia associated with the atrial fibrillation.  She

denies any other cardiac complaints such as chest pain or any other significant

abnormalities.  She had cardiac enzymes obtained.  Troponin I was normal.  There is

no indication of any ischemia on the EKGs and no indication the patient has suffered

any myocardial infarction.  Her BNP was only mildly elevated at 326, somehow with

only mild congestive heart failure symptoms, which would not be unusual in someone

of 86 years old who most likely has diastolic dysfunction or associated with the

atrial fibrillation.  She has no complaints at this time. 



PAST MEDICAL HISTORY:  Significant for atrial fibrillation, dementia.  She had a

history of carotid artery stenosis in the past and gastroesophageal reflux disease. 



PAST SURGICAL HISTORY:  She has had back surgery, foot surgery, hand surgery,

shoulder surgery, and ankle surgery. 



SOCIAL HISTORY:  She has no history of alcohol or tobacco abuse.



REVIEW OF SYSTEMS:  Not obtainable to any accuracy.



ALLERGIES:  NONE.



PRESENT MEDICATIONS:  Her medications prior to admission included;

1. Omeprazole.

2. MS Contin.

3. Xarelto.

4. Lasix.

5. Gabapentin.

6. Metoclopramide.

7. Robaxin.

8. Potassium.

9. Aspirin 81 mg a day.

10. Lipitor.

11. Diltiazem.

12. Lopressor.

13. DuoNebs.

14. Zofran.  

15. P.r.n. medications.

16. Also, Lasix 20 mg a day.



ALLERGIES:  NONE.



FAMILY HISTORY:  Noncontributory.



PHYSICAL EXAMINATION:

GENERAL:  Reveals an elderly female, who is at least alert, was arousable easily

from sleep, and knows her name.  She is complaining of being cold at this time,

otherwise is unremarkable.  She knows where she is.  When asked why she came to the

hospital, she said she want to make sure whether or not she had pneumonia, but

otherwise has denied any significant shortness of breath or chest pain. 

VITAL SIGNS:  Blood pressure 108/58.  She is afebrile.  Respiratory rate is 18,

heart rate is anywhere between 97 to 114 with atrial fibrillation. 

HEENT:  Reveals head to be normocephalic and atraumatic.  I did not hear any

significant bruits.  There is no obvious JVD. 

CHEST:  Her chest has occasional rhonchi.  She has decreased inspiratory effort.  I

did not hear any wheezing. 

CARDIOVASCULAR:  Reveals an irregularly irregular rhythm.  I do not hear any

significant murmurs. 

ABDOMEN:  Soft and nontender.  Positive bowel sounds are present. 

EXTREMITIES:  No clubbing, cyanosis, or edema. 

NEUROLOGIC:  The patient appears to be confused at least, but most likely this is

just due to dementia. 

SKIN:  Warm and dry.



LABORATORY DATA:  Noted for the normal cardiac enzymes and also sodium was 127,

bicarb is 22, blood sugar was 177.  WBC is 7.1, hemoglobin is 9.4, platelet count

was 329,000.  BNP was 324.  Her cholesterol level last was done in December 2018

which showed LDL of 81. 



IMPRESSION:  

1. Elderly female with most likely pneumonia.  She will be continued on her

medications with the antibiotics. 

2. History of chronic atrial fibrillation.  The rate is under reasonable control

majority of the time.  We will continue these medications also for her atrial

fibrillation and she is on metoprolol for rate control.  We will continue also

Xarelto unless she has any problems with bleeding. 

3. Dementia versus mental status changes.  I have not seen this lady in the past and

this may be her baseline.  This will be dealt with by the primary care service. 

4. Some history of hematuria.  I believe, she has been already seen by the

urologist.  At this time, overall cardiac status appears to be relatively stable. 







Job ID:  [id number]

## 2019-01-06 NOTE — PDOC.CTH
Cardiology Progress Note





- Subjective





The pt seen and examined.  No overnight events.  No cardiac complaints.  She 

complains of dry month.  She was instructed to cut down fluid intakes and have 

ice instead. 





- Objective


 Vital Signs











  Temp Pulse Pulse Pulse Resp BP BP


 


 01/06/19 15:20    87  107 H   108/57 L  101/56 L


 


 01/06/19 14:35   80    20  


 


 01/06/19 12:00  97.6 F  88    18  


 


 01/06/19 10:53   88    24 H  


 


 01/06/19 08:30       


 


 01/06/19 07:21  97.7 F  90    18  


 


 01/06/19 06:34       


 


 01/06/19 06:29   95    20  














  BP Pulse Ox


 


 01/06/19 15:20  


 


 01/06/19 14:35  


 


 01/06/19 12:00  105/58 L  98


 


 01/06/19 10:53  


 


 01/06/19 08:30   94 L


 


 01/06/19 07:21  109/71  93 L


 


 01/06/19 06:34   95


 


 01/06/19 06:29   99








 











Admit Weight                   173 lb 6.4 oz


 


Weight                         173 lb 6.4 oz














 











 01/05/19 01/06/19 01/07/19





 06:59 06:59 06:59


 


Intake Total 1871 1340 


 


Output Total 1320 900 


 


Balance 551 440 














- Physical Examination


General/Neuro: other: (comfused)


Neck: no JVD present


Lungs: other: (diminished at bases, Lt>Rt)


Heart: other: (irregular)


Abdomen: soft


Extremities: other: (mild anasarca)





- Telemetry


Telemetry Rhythm: Afib 80s





- Labs


Result Diagrams: 


 01/04/19 05:25





 01/06/19 13:13


 Troponin/CKMB











Troponin I  Less than  0.010 ng/mL (< 0.028)   01/03/19  11:35    














- Assessment/Plan





1. PNA - on IV abx; managed by pcp


2. Acute on Diastolic HF with large lt pleural effusion - Stable with Lasix 

40mg IV BID and Metoprolol 50mg BID, which decrease to 25mg BID and start 

Lisinopril 10mg qd.  


3. Afib - well controlled HR with Diltiazem and bblocker.  On Xarelto. 


4. HTN - stable


5. Dementia 


6. Severe MR/TR - 


MAR reviewed 





* Echo on 01/06/2019 showed EF 50-55%, grade III diastolic dysfunction, severe 

MR, severe TR and large Lt pleural effusion. 


* Dr Pinon's pt





Pt. seen and eval. by me. I agree with the A/P by the NP. Chest decr. bases. 

irreg., No edema.





Review of Systems





- Review of Systems


Constitutional: reports: no symptoms reported


EENTM: reports: no symptoms reported


Respiratory: reports: no symptoms reported


Cardiac (ROS): reports: no symptoms reported


ABD/GI: reports: no symptoms reported


: reports: no symptoms reported


Musculoskeletal: reports: no symptoms reported

## 2019-01-07 NOTE — PRG
DATE OF SERVICE:  01/07/2019



SUBJECTIVE:  The patient was seen and examined at bedside.  The nurse noticed that

she started having some rash kind of generalized all over her body, which is a

non-itching. 



OBJECTIVE:  VITAL SIGNS:  Blood pressure is 113/53, pulse is 98, respiratory rate

20, O2 saturation is 100% on 3 L by nasal cannula, temperature is 97.7, maximal

temperature is 98.1. 

HEENT:  Her head is atraumatic and normocephalic.  Sclerae are nonicteric.  Oral

mucosa is moist. 

NECK:  Supple. 

LUNGS:  Breath sounds somewhat diminished at both bases and few crackles

bilaterally. 

HEART:  S1 and S2, irregularly irregular.  No S3.  No S4. 

ABDOMEN:  Soft, nontender, and nondistended. 

EXTREMITIES:  No clubbing, cyanosis, or edema. 

NEUROLOGIC:  She is alert and oriented x3.  There is no any motor deficits.  I do

not see any cranial nerve problems. 



LABORATORY DATA:  No labs today.



IMPRESSION:  

1. Pneumonia, treated as healthcare associated pneumonia, on levofloxacin, to

continue. 

2. Sepsis with improved leukocytosis.  Negative blood cultures.

3. Bilateral pleural effusion secondary to congestive heart failure.  The patient

had echocardiogram done, which showed ejection fraction of the left ventricle

estimated at 50% to 55%.  Restrictive filling pattern.  Mildly enlarged right

ventricle cavity.  Left atrium moderately dilated.  Right atrium moderately

enlarged.  Severe mitral regurgitation.  Severe tricuspid regurgitation, large left

pleural effusion, atrial fibrillation with controlled ventricular rate. 

4. Right hydronephrosis, most likely chronic.  We are not planning to do anything

about this. 

5. Abdominal pain with nausea and vomiting, resolved on PPI.  The patient is started

eating much better. 

6. Hyponatremia and hypochloremia, improved, most likely related to congestive heart

failure. 

7. Anorexia, improved.



PLAN:  Plan is to continue diuresis.  Continue Protonix p.o.  Continue levofloxacin.

 Continue PT.  Obtain BMP. 







Job ID:  [id number]

## 2019-01-08 LAB
ANION GAP SERPL CALC-SCNC: 11 MMOL/L (ref 10–20)
BASOPHILS # BLD AUTO: 0.1 THOU/UL (ref 0–0.2)
BASOPHILS NFR BLD AUTO: 1 % (ref 0–1)
BUN SERPL-MCNC: 9 MG/DL (ref 9.8–20.1)
CALCIUM SERPL-MCNC: 8.1 MG/DL (ref 7.8–10.44)
CHLORIDE SERPL-SCNC: 92 MMOL/L (ref 98–107)
CO2 SERPL-SCNC: 28 MMOL/L (ref 23–31)
CREAT CL PREDICTED SERPL C-G-VRATE: 61 ML/MIN (ref 70–130)
EOSINOPHIL # BLD AUTO: 0.3 THOU/UL (ref 0–0.7)
EOSINOPHIL NFR BLD AUTO: 4 % (ref 0–10)
GLUCOSE SERPL-MCNC: 123 MG/DL (ref 83–110)
HGB BLD-MCNC: 9.4 G/DL (ref 12–16)
LYMPHOCYTES # BLD: 1.1 THOU/UL (ref 1.2–3.4)
LYMPHOCYTES NFR BLD AUTO: 13.9 % (ref 21–51)
MCH RBC QN AUTO: 26 PG (ref 27–31)
MCV RBC AUTO: 82.3 FL (ref 78–98)
MONOCYTES # BLD AUTO: 0.5 THOU/UL (ref 0.11–0.59)
MONOCYTES NFR BLD AUTO: 6 % (ref 0–10)
NEUTROPHILS # BLD AUTO: 5.8 THOU/UL (ref 1.4–6.5)
NEUTROPHILS NFR BLD AUTO: 75.1 % (ref 42–75)
PLATELET # BLD AUTO: 392 THOU/UL (ref 130–400)
POTASSIUM SERPL-SCNC: 3.4 MMOL/L (ref 3.5–5.1)
RBC # BLD AUTO: 3.63 MILL/UL (ref 4.2–5.4)
SODIUM SERPL-SCNC: 128 MMOL/L (ref 136–145)
WBC # BLD AUTO: 7.8 THOU/UL (ref 4.8–10.8)

## 2019-01-08 NOTE — PDOC.PN
- Subjective


Encounter Start Date: 01/08/19


Encounter Start Time: 18:00


Diffuse rash present overlying torso.  First noted 1/7.  Non pruritic.  At the 

time of my evaluation, patient is confused and agitated, unable to provide 

history.








- Objective


Resuscitation Status - Order Detail:





01/03/19 14:32


Resuscitation Status Routine 


   Resuscitation Status: FULL: Full Resuscitation


   Discussed with: patient and 








Vital Signs & Weight: 


 Vital Signs (12 hours)











  Temp Pulse Resp BP Pulse Ox


 


 01/08/19 18:58   104 H  22 H  


 


 01/08/19 15:24  97.6 F   19  95/50 L  97


 


 01/08/19 14:27   110 H  18  


 


 01/08/19 11:33  97.9 F  98  14  96/52 L  100


 


 01/08/19 10:54   100  18  








 Weight











Admit Weight                   173 lb 6.4 oz


 


Weight                         168 lb 6.4 oz














I&O: 


 











 01/07/19 01/08/19 01/09/19





 06:59 06:59 06:59


 


Intake Total 1560 680 820


 


Output Total 1200 850 550


 


Balance 360 -170 270











Result Diagrams: 


 01/08/19 05:09





 01/08/19 05:09





Phys Exam





- Physical Examination


Elderly, frail, agitated


HEENT: oral pharynx no lesions


Neck: supple, full ROM


Fair aeration


Cardiovascular: irregular


Gastrointestinal: soft, non-tender


Musculoskeletal: no edema


Neurological: moves all 4 limbs


Deviation from normal: Not oriented to time or place


Deviation from normal: Macular rash torso diffusely





Dx/Plan


(1) Pneumonia


Code(s): J18.9 - PNEUMONIA, UNSPECIFIED ORGANISM   Status: Acute   Comment: 

Blood cultures negative.  New rash, consistent with drug rash, worried about 

levaquin or protonix as potential new meds.  Changed abx to doxycycline   





(2) Severe mitral regurgitation


Code(s): I34.0 - NONRHEUMATIC MITRAL (VALVE) INSUFFICIENCY   Status: Chronic   

Comment: Significant comorbidity   





(3) Severe tricuspid regurgitation


Code(s): I07.1 - RHEUMATIC TRICUSPID INSUFFICIENCY   Status: Chronic   





(4) Hydronephrosis, right


Code(s): N13.30 - UNSPECIFIED HYDRONEPHROSIS   Status: Acute   Comment: Likely 

chronic, no additional evaluation planned   





(5) Acute on chronic diastolic CHF (congestive heart failure)


Code(s): I50.33 - ACUTE ON CHRONIC DIASTOLIC (CONGESTIVE) HEART FAILURE   Status

: Acute   Comment: EF 50-55%.  Patient unable to tolerate lisinopril --> 

developed hypotension.  Unknown if rash also possibly related.   





(6) Dementia


Code(s): F03.90 - UNSPECIFIED DEMENTIA WITHOUT BEHAVIORAL DISTURBANCE   Status: 

Chronic   





(7) Atrial fibrillation


Code(s): I48.91 - UNSPECIFIED ATRIAL FIBRILLATION   Status: Chronic   


Qualifiers: 


   Atrial fibrillation type: paroxysmal   Qualified Code(s): I48.0 - Paroxysmal 

atrial fibrillation   


Comment: Continue xarelto.  She has been on diltiazen and metoprolol on home 

med list, but presently not receiving due to hypotension   





(8) Hyponatremia


Code(s): E87.1 - HYPO-OSMOLALITY AND HYPONATREMIA   Status: Chronic   





(9) Recurrent left pleural effusion


Code(s): J90 - PLEURAL EFFUSION, NOT ELSEWHERE CLASSIFIED   Status: Acute   





- Plan





* Patient is eating poorly.  Stopped protonix to simplify medication list in 

setting of drug rash.  Multiple medical comorbidities with guarded prognosis.  

Reviewed SW note this evening, may return to [facility] when stable 

enough to do so.  I did not have opportunity to speak with her family today, 

and am unsure of their expectations.  Medical record indicates previous 

providers spoke with family and full code status communicated.

## 2019-01-09 RX ADMIN — DIPHENHYDRAMINE HYDROCHLORIDE, ZINC ACETATE SCH APPLIC: 2; .1 CREAM TOPICAL at 20:29

## 2019-01-09 RX ADMIN — HYDROCODONE BITARTRATE AND ACETAMINOPHEN PRN TAB: 5; 325 TABLET ORAL at 22:44

## 2019-01-09 RX ADMIN — ONDANSETRON PRN MG: 2 INJECTION INTRAMUSCULAR; INTRAVENOUS at 17:47

## 2019-01-09 RX ADMIN — ONDANSETRON PRN MG: 2 INJECTION INTRAMUSCULAR; INTRAVENOUS at 12:13

## 2019-01-09 RX ADMIN — DIPHENHYDRAMINE HYDROCHLORIDE, ZINC ACETATE SCH APPLIC: 2; .1 CREAM TOPICAL at 14:31

## 2019-01-09 RX ADMIN — HYDROCODONE BITARTRATE AND ACETAMINOPHEN PRN TAB: 5; 325 TABLET ORAL at 17:50

## 2019-01-09 NOTE — PDOC.EVN
Event Note





- Event Note


Event Note: 





ACP note-


Chart noted and reviewed on detail.First visit with pt.


Care discussed w Pt and  at bedside.


Pt awake and alert and seems to comprehend the care and disease.keeps c/o back 

pain





I discussed with them that she indeed has multiple co morbidities and 

clinically is doing poorly.Discussed resuscitation status and what it 

entails.Pt and  very hard of hearing and things explained multiple times.


At this time they understand the gravity of situation nd pt does not want to be 

Intubated or undergo CPR,resuscitation efforts if need arises.


Confirmed DNI/DNR.


Ordered entered in [email or web address] updated.


will consult palliative care team as well .





Total time spent in discussion 20 minutes.

## 2019-01-09 NOTE — PDOC.PN
- Subjective


Encounter Start Date: 01/09/19


Encounter Start Time: 15:11


Subjective: c/o back pain .feels poorly w poor appetite and weakness and SOB


-: worsening rash 





- Objective


Resuscitation Status - Order Detail:





01/09/19 11:09


Resuscitation Status Routine 


   Resuscitation Status: DNAR: NO Resuscitation


   Discussed with: discussed w pt and  in detail.see ACP note








MAR Reviewed: Yes


Vital Signs & Weight: 


 Vital Signs (12 hours)











  Temp Pulse Resp BP Pulse Ox


 


 01/09/19 14:58   102 H  20   96


 


 01/09/19 11:26  97.9 F  117 H  17  140/73  99


 


 01/09/19 10:57   104 H  16   96


 


 01/09/19 07:55  97.9 F  87  20  149/72 H  96


 


 01/09/19 07:51      97


 


 01/09/19 07:50   74  16   97


 


 01/09/19 04:00  97.7 F  113 H  20  97/59 L  97








 Weight











Admit Weight                   173 lb 6.4 oz


 


Weight                         168 lb 8 oz














I&O: 


 











 01/08/19 01/09/19 01/10/19





 06:59 06:59 06:59


 


Intake Total 680 1060 


 


Output Total 850 1300 


 


Balance -170 -240 











Result Diagrams: 


 01/08/19 05:09





 01/08/19 05:09


Additional Labs: 





Microbiology





01/04/19 Unknown   Stool   Stool Occult Blood (ANDREIA) - Final


01/04/19 17:18   Urine Straight Catheter   Urine Culture - Final


                              NO GROWTH AT 48 HOURS


01/03/19 13:08   Venous blood - Right Hand   Blood Culture - Final


                              NO GROWTH IN 5 DAYS


01/03/19 12:05   Nasal swab   Influenza Types A,B Direct EIA - Final


01/03/19 11:35   Venous blood - Left Hand   Blood Culture - Final


                              NO GROWTH IN 5 DAYS





 Laboratory Tests











  12/10/18 01/03/19





  11:13 11:35


 


B-Natriuretic Peptide  353.1 H  326.4 H














Phys Exam





- Physical Examination


ill apperaing,awake and alert


HEENT: PERRLA, sclera anicteric


dry raw mucosa


Neck: no nodes, no JVD, supple, full ROM


Respiratory: no wheezing, no rales, no rhonchi, clear to auscultation bilateral


Cardiovascular: irregular


Gastrointestinal: soft, non-tender, no distention, positive bowel sounds


Musculoskeletal: pulses present, edema present


Neurological: non-focal, normal sensation, moves all 4 limbs


Psychiatric: normal affect, A&O x 3


Deviation from normal: truckal MP rash





Dx/Plan


(1) Acute on chronic diastolic CHF (congestive heart failure)


Code(s): I50.33 - ACUTE ON CHRONIC DIASTOLIC (CONGESTIVE) HEART FAILURE   Status

: Acute   Comment: EF 50-55%.  Patient unable to tolerate lisinopril --> 

developed hypotension.  Unknown if rash also possibly related.   





(2) Atrial fibrillation with RVR


Code(s): I48.91 - UNSPECIFIED ATRIAL FIBRILLATION   Status: Acute   Comment: BB 

and CCB on hold due to Hypotension.on Xarelto   





(3) Hydronephrosis, right


Code(s): N13.30 - UNSPECIFIED HYDRONEPHROSIS   Status: Acute   Comment: Likely 

chronic, no additional evaluation planned   





(4) Pneumonia


Code(s): J18.9 - PNEUMONIA, UNSPECIFIED ORGANISM   Status: Acute   Comment: 

Blood cultures negative.  New rash, consistent with drug rash, worried about 

levaquin or protonix as potential new meds.  Changed abx to doxycycline   





(5) Recurrent left pleural effusion


Code(s): J90 - PLEURAL EFFUSION, NOT ELSEWHERE CLASSIFIED   Status: Acute   





(6) Allergic drug rash


Code(s): L27.0 - GEN SKIN ERUPTION DUE TO DRUGS AND MEDS TAKEN INTERNALLY   

Status: Acute   





(7) Dementia


Code(s): F03.90 - UNSPECIFIED DEMENTIA WITHOUT BEHAVIORAL DISTURBANCE   Status: 

Chronic   





(8) Severe mitral regurgitation


Code(s): I34.0 - NONRHEUMATIC MITRAL (VALVE) INSUFFICIENCY   Status: Chronic   

Comment: Significant comorbidity   





(9) Severe tricuspid regurgitation


Code(s): I07.1 - RHEUMATIC TRICUSPID INSUFFICIENCY   Status: Chronic   





(10) Weakness generalized


Code(s): R53.1 - WEAKNESS   Status: Acute   





(11) Carotid stenosis, right


Code(s): I65.21 - OCCLUSION AND STENOSIS OF RIGHT CAROTID ARTERY   Status: 

Chronic   





(12) Chronic low back pain


Code(s): M54.5 - LOW BACK PAIN; G89.29 - OTHER CHRONIC PAIN   Status: Chronic   





(13) GERD (gastroesophageal reflux disease)


Code(s): K21.9 - GASTRO-ESOPHAGEAL REFLUX DISEASE WITHOUT ESOPHAGITIS   Status: 

Chronic   


Qualifiers: 


   Esophagitis presence: without esophagitis   Qualified Code(s): K21.9 - Gastro

-esophageal reflux disease without esophagitis   





- Plan


plan discussed w/ family, continue antibiotics, PT/OT, respiratory therapy, 

incentive spirometry, out of bed/ambulate, DVT proph w/SCDs


restart low dose CCB as HR high and BP imrpoved.monitor.


-: add prn PO and Top benadryl.


-: restart diureiss gently if BP stable tomorrow.


-: increase ambulation if possible


-: overall very poor prognosis W multiple co morbidities & acute illnesses.





* .








Review of Systems





- Review of Systems


Constitutional: weakness, malaise


Respiratory: Dry, SOB with Excertion


Cardiovascular: negative: chest pain, palpitations, orthopnea, paroxysmal 

nocturnal dyspnea, edema, light headedness, other


Gastrointestinal: Nausea.  negative: Vomiting, Abdominal Pain, Diarrhea, 

Constipation, Melena, Hematochezia, Other


Genitourinary: negative: Dysuria, Frequency, Incontinence, Hematuria, Retention

, Other


Musculoskeletal: Back Pain.  negative: Neck Pain, Shoulder Pain, Arm Pain, Hand 

Pain, Leg Pain, Foot Pain, Other


Skin: Rash





- Medications/Allergies


Allergies/Adverse Reactions: 


 Allergies











Allergy/AdvReac Type Severity Reaction Status Date / Time


 


No Known Drug Allergies Allergy   Verified 08/30/18 16:00











Medications: 


 Current Medications





Acetaminophen (Tylenol)  650 mg PO Q4H PRN


   PRN Reason: Headache/Fever/Mild Pain (1-3)


   Last Admin: 01/07/19 21:13 Dose:  650 mg


Acetaminophen (Tylenol)  650 mg CT Q4H PRN


   PRN Reason: Headache/Fever/Mild Pain (1-3)


Hydrocodone Bitart/Acetaminophen (Norco 5/325)  1 tab PO Q4H PRN


   PRN Reason: Severe Pain (7-10)


Albuterol/Ipratropium (Duoneb)  3 ml NEB [id number] [facility]


   Last Admin: 01/09/19 14:58 Dose:  3 ml


Diltiazem HCl (Cardizem Cd)  180 mg PO DAILY [facility]


Diphenhydramine HCl (Benadryl)  25 mg PO Q6H PRN


   PRN Reason: Itching & Insomnia


Doxycycline Hyclate (Vibramycin)  100 mg PO BID [facility]


   Last Admin: 01/09/19 08:03 Dose:  100 mg


Gabapentin (Neurontin)  300 mg PO HS [facility]


   Last Admin: 01/08/19 21:16 Dose:  300 mg


Methocarbamol (Robaxin)  500 mg PO Q8HR [facility]


   Last Admin: 01/09/19 14:31 Dose:  500 mg


Miscellaneous Medication (Pharmacy To Dose)  1 each IVPB ONE PRN


   PRN Reason: Pharmacy to dose


   Stop: 01/13/19 14:32


Ondansetron HCl (Zofran)  4 mg SLOW IVP Q6H PRN


   PRN Reason: Nausea/Vomiting


   Last Admin: 01/09/19 12:13 Dose:  4 mg


Oxybutynin Chloride (Ditropan)  5 mg PO HS [facility]


   Last Admin: 01/08/19 21:16 Dose:  5 mg


Polyethylene Glycol (Miralax)  17 gm PO DAILY [facility]


   Last Admin: 01/09/19 08:03 Dose:  17 gm


Potassium Chloride (Klor-Con 10)  10 meq PO DAILY [facility]


   Last Admin: 01/09/19 08:03 Dose:  10 meq


Rivaroxaban (Xarelto)  20 mg PO DAILY [facility]


   Last Admin: 01/09/19 08:02 Dose:  20 mg


Senna (Senokot)  1 tab PO BID PRN


   PRN Reason: Constipation


Senna/Docusate Sodium (Senokot S)  2 tab PO BID PRN


   PRN Reason: Constipation


Tramadol HCl (Ultram)  50 mg PO Q6H PRN


   PRN Reason: Moderate Pain (4-6)


   Last Admin: 01/09/19 08:53 Dose:  50 mg


Zinc Acetate/Diphenhydramine (Benadryl 2% Cream)  0 gm TOP TID [facility]


   Last Admin: 01/09/19 14:31 Dose:  1 applic


Zinc Sulfate (Zinc Sulfate)  220 mg PO DAILY [facility]


   Last Admin: 01/09/19 08:02 Dose:  220 mg

## 2019-01-10 LAB
ANION GAP SERPL CALC-SCNC: 14 MMOL/L (ref 10–20)
BASOPHILS # BLD AUTO: 0 THOU/UL (ref 0–0.2)
BASOPHILS NFR BLD AUTO: 0.3 % (ref 0–1)
BUN SERPL-MCNC: 7 MG/DL (ref 9.8–20.1)
CALCIUM SERPL-MCNC: 8.4 MG/DL (ref 7.8–10.44)
CHLORIDE SERPL-SCNC: 91 MMOL/L (ref 98–107)
CO2 SERPL-SCNC: 27 MMOL/L (ref 23–31)
CREAT CL PREDICTED SERPL C-G-VRATE: 80 ML/MIN (ref 70–130)
EOSINOPHIL # BLD AUTO: 0.4 THOU/UL (ref 0–0.7)
EOSINOPHIL NFR BLD AUTO: 3.5 % (ref 0–10)
GLUCOSE SERPL-MCNC: 100 MG/DL (ref 83–110)
HGB BLD-MCNC: 9.9 G/DL (ref 12–16)
LYMPHOCYTES # BLD: 1.2 THOU/UL (ref 1.2–3.4)
LYMPHOCYTES NFR BLD AUTO: 10 % (ref 21–51)
MCH RBC QN AUTO: 25.9 PG (ref 27–31)
MCV RBC AUTO: 82.5 FL (ref 78–98)
MONOCYTES # BLD AUTO: 0.5 THOU/UL (ref 0.11–0.59)
MONOCYTES NFR BLD AUTO: 4.4 % (ref 0–10)
NEUTROPHILS # BLD AUTO: 9.4 THOU/UL (ref 1.4–6.5)
NEUTROPHILS NFR BLD AUTO: 81.8 % (ref 42–75)
PLATELET # BLD AUTO: 406 THOU/UL (ref 130–400)
POTASSIUM SERPL-SCNC: 3.7 MMOL/L (ref 3.5–5.1)
RBC # BLD AUTO: 3.83 MILL/UL (ref 4.2–5.4)
SODIUM SERPL-SCNC: 128 MMOL/L (ref 136–145)
WBC # BLD AUTO: 11.5 THOU/UL (ref 4.8–10.8)

## 2019-01-10 RX ADMIN — ONDANSETRON PRN MG: 2 INJECTION INTRAMUSCULAR; INTRAVENOUS at 19:56

## 2019-01-10 RX ADMIN — ONDANSETRON PRN MG: 2 INJECTION INTRAMUSCULAR; INTRAVENOUS at 14:17

## 2019-01-10 RX ADMIN — ONDANSETRON PRN MG: 2 INJECTION INTRAMUSCULAR; INTRAVENOUS at 05:55

## 2019-01-10 RX ADMIN — DIPHENHYDRAMINE HYDROCHLORIDE, ZINC ACETATE SCH APPLIC: 2; .1 CREAM TOPICAL at 20:01

## 2019-01-10 RX ADMIN — Medication SCH ML: at 19:56

## 2019-01-10 RX ADMIN — DIPHENHYDRAMINE HYDROCHLORIDE, ZINC ACETATE SCH APPLIC: 2; .1 CREAM TOPICAL at 14:18

## 2019-01-10 RX ADMIN — HYDROCODONE BITARTRATE AND ACETAMINOPHEN PRN TAB: 5; 325 TABLET ORAL at 14:18

## 2019-01-10 RX ADMIN — DIPHENHYDRAMINE HYDROCHLORIDE, ZINC ACETATE SCH APPLIC: 2; .1 CREAM TOPICAL at 09:26

## 2019-01-10 NOTE — PDOC.PN
- Subjective


Encounter Start Date: 01/10/19


Encounter Start Time: 15:02


Subjective: feels better.





- Objective


Resuscitation Status - Order Detail:





01/09/19 11:09


Resuscitation Status Routine 


   Resuscitation Status: DNAR: NO Resuscitation


   Discussed with: discussed w pt and  in detail.see ACP note








MAR Reviewed: Yes


Vital Signs & Weight: 


 Vital Signs (12 hours)











  Temp Pulse Resp BP Pulse Ox


 


 01/10/19 14:58   92  16   94 L


 


 01/10/19 11:23  99.6 F  114 H  18  129/59 L  99


 


 01/10/19 10:46   106 H  16   98


 


 01/10/19 09:24   97   105/53 L 


 


 01/10/19 08:20      98


 


 01/10/19 07:51  97.5 F L  97  18  103/58 L  98


 


 01/10/19 06:51      93 L


 


 01/10/19 06:49   89  16   93 L


 


 01/10/19 03:55  98 F  99  20  117/57 L  100








 Weight











Admit Weight                   173 lb 6.4 oz


 


Weight                         172 lb 1.6 oz














I&O: 


 











 01/09/19 01/10/19 01/11/19





 06:59 06:59 06:59


 


Intake Total 1060 965 


 


Output Total 1300 1300 


 


Balance -240 -335 











Result Diagrams: 


 01/10/19 04:45





 01/10/19 04:45


Additional Labs: 





Microbiology





01/04/19 Unknown   Stool   Stool Occult Blood (ANDREIA) - Final


01/04/19 17:18   Urine Straight Catheter   Urine Culture - Final


                              NO GROWTH AT 48 HOURS


01/03/19 13:08   Venous blood - Right Hand   Blood Culture - Final


                              NO GROWTH IN 5 DAYS


01/03/19 12:05   Nasal swab   Influenza Types A,B Direct EIA - Final


01/03/19 11:35   Venous blood - Left Hand   Blood Culture - Final


                              NO GROWTH IN 5 DAYS





 Laboratory Tests











  12/10/18 01/03/19





  11:13 11:35


 


B-Natriuretic Peptide  353.1 H  326.4 H














Phys Exam





- Physical Examination


Constitutional: NAD


Pale


HEENT: PERRLA, moist MMs, sclera anicteric, oral pharynx no lesions


Neck: no nodes, no JVD, supple, full ROM


Respiratory: no wheezing, no rales


Cardiovascular: RRR, no significant murmur


Gastrointestinal: soft, non-tender, no distention, positive bowel sounds


Musculoskeletal: no edema, pulses present


Neurological: non-focal, normal sensation, moves all 4 limbs


Psychiatric: normal affect


Skin: no rash





Dx/Plan


(1) Acute on chronic diastolic CHF (congestive heart failure)


Code(s): I50.33 - ACUTE ON CHRONIC DIASTOLIC (CONGESTIVE) HEART FAILURE   Status

: Acute   Comment: EF 50-55%.  Patient unable to tolerate lisinopril --> 

developed hypotension.  Unknown if rash also possibly related.   





(2) Atrial fibrillation with RVR


Code(s): I48.91 - UNSPECIFIED ATRIAL FIBRILLATION   Status: Acute   Comment: BB 

and CCB on hold due to Hypotension.on Xarelto   





(3) Hydronephrosis, right


Code(s): N13.30 - UNSPECIFIED HYDRONEPHROSIS   Status: Acute   Comment: Likely 

chronic, no additional evaluation planned   





(4) Pneumonia


Code(s): J18.9 - PNEUMONIA, UNSPECIFIED ORGANISM   Status: Acute   Comment: 

Blood cultures negative.  New rash, consistent with drug rash, worried about 

levaquin or protonix as potential new meds.  Changed abx to doxycycline   





(5) Recurrent left pleural effusion


Code(s): J90 - PLEURAL EFFUSION, NOT ELSEWHERE CLASSIFIED   Status: Acute   





(6) Allergic drug rash


Code(s): L27.0 - GEN SKIN ERUPTION DUE TO DRUGS AND MEDS TAKEN INTERNALLY   

Status: Acute   





(7) Dementia


Code(s): F03.90 - UNSPECIFIED DEMENTIA WITHOUT BEHAVIORAL DISTURBANCE   Status: 

Chronic   





(8) Severe mitral regurgitation


Code(s): I34.0 - NONRHEUMATIC MITRAL (VALVE) INSUFFICIENCY   Status: Chronic   

Comment: Significant comorbidity   





(9) Severe tricuspid regurgitation


Code(s): I07.1 - RHEUMATIC TRICUSPID INSUFFICIENCY   Status: Chronic   





(10) Weakness generalized


Code(s): R53.1 - WEAKNESS   Status: Acute   





(11) Carotid stenosis, right


Code(s): I65.21 - OCCLUSION AND STENOSIS OF RIGHT CAROTID ARTERY   Status: 

Chronic   





(12) Chronic low back pain


Code(s): M54.5 - LOW BACK PAIN; G89.29 - OTHER CHRONIC PAIN   Status: Chronic   





(13) GERD (gastroesophageal reflux disease)


Code(s): K21.9 - GASTRO-ESOPHAGEAL REFLUX DISEASE WITHOUT ESOPHAGITIS   Status: 

Chronic   


Qualifiers: 


   Esophagitis presence: without esophagitis   Qualified Code(s): K21.9 - Gastro

-esophageal reflux disease without esophagitis   





- Plan


respiratory therapy, incentive spirometry, DVT proph w/SCDs


restarted low dose lasix.monitor.clinically stable but no improvement


-: PCT for [facility] of care


-: Ot/PT.


-: am labs





* .








Review of Systems





- Review of Systems


Other: 





limited ROS





- Medications/Allergies


Allergies/Adverse Reactions: 


 Allergies











Allergy/AdvReac Type Severity Reaction Status Date / Time


 


No Known Drug Allergies Allergy   Verified 08/30/18 16:00











Medications: 


 Current Medications





Acetaminophen (Tylenol)  650 mg PO Q4H PRN


   PRN Reason: Headache/Fever/Mild Pain (1-3)


   Last Admin: 01/07/19 21:13 Dose:  650 mg


Acetaminophen (Tylenol)  650 mg [place] Q4H PRN


   PRN Reason: Headache/Fever/Mild Pain (1-3)


Hydrocodone Bitart/Acetaminophen (Norco 5/325)  1 tab PO Q4H PRN


   PRN Reason: Severe Pain (7-10)


   Last Admin: 01/10/19 14:18 Dose:  1 tab


Albuterol/Ipratropium (Duoneb)  3 ml NEB [id number] [facility]


   Last Admin: 01/10/19 14:58 Dose:  3 ml


Diltiazem HCl (Cardizem Cd)  180 mg PO DAILY [facility]


   Last Admin: 01/10/19 09:25 Dose:  180 mg


Diphenhydramine HCl (Benadryl)  25 mg PO Q6H PRN


   PRN Reason: Itching & Insomnia


   Last Admin: 01/09/19 20:27 Dose:  25 mg


Doxycycline Hyclate (Vibramycin)  100 mg PO BID [facility]


   Last Admin: 01/10/19 09:25 Dose:  100 mg


Gabapentin (Neurontin)  300 mg PO HS [facility]


   Last Admin: 01/09/19 20:21 Dose:  300 mg


Methocarbamol (Robaxin)  500 mg PO Q8HR [facility]


   Last Admin: 01/10/19 14:18 Dose:  500 mg


Miscellaneous Medication (Pharmacy To Dose)  1 each IVPB ONE PRN


   PRN Reason: Pharmacy to dose


   Stop: 01/13/19 14:32


Ondansetron HCl (Zofran)  4 mg SLOW IVP Q6H PRN


   PRN Reason: Nausea/Vomiting


   Last Admin: 01/10/19 14:17 Dose:  4 mg


Oxybutynin Chloride (Ditropan)  5 mg PO [facility]


   Last Admin: 01/09/19 20:22 Dose:  5 mg


Polyethylene Glycol (Miralax)  17 gm PO DAILY [facility]


   Last Admin: 01/10/19 09:27 Dose:  17 gm


Potassium Chloride (Klor-Con 10)  10 meq PO DAILY [facility]


   Last Admin: 01/10/19 09:25 Dose:  10 meq


Rivaroxaban (Xarelto)  20 mg PO DAILY [facility]


   Last Admin: 01/10/19 09:25 Dose:  20 mg


Senna (Senokot)  1 tab PO BID PRN


   PRN Reason: Constipation


Senna/Docusate Sodium (Senokot S)  2 tab PO BID PRN


   PRN Reason: Constipation


Sodium Chloride (Flush - Normal Saline)  10 ml IVF Q12HR [facility]


Sodium Chloride (Flush - Normal Saline)  10 ml IVF PRN PRN


   PRN Reason: Saline Flush


Tramadol HCl (Ultram)  50 mg PO Q6H PRN


   PRN Reason: Moderate Pain (4-6)


   Last Admin: 01/10/19 09:28 Dose:  50 mg


Zinc Acetate/Diphenhydramine (Benadryl 2% Cream)  0 gm TOP TID [facility]


   Last Admin: 01/10/19 14:18 Dose:  1 applic


Zinc Sulfate (Zinc Sulfate)  220 mg PO DAILY [facility]


   Last Admin: 01/10/19 09:25 Dose:  220 mg

## 2019-01-11 RX ADMIN — DIPHENHYDRAMINE HYDROCHLORIDE, ZINC ACETATE SCH: 2; .1 CREAM TOPICAL at 21:17

## 2019-01-11 RX ADMIN — Medication SCH ML: at 21:14

## 2019-01-11 RX ADMIN — DIPHENHYDRAMINE HYDROCHLORIDE, ZINC ACETATE SCH APPLIC: 2; .1 CREAM TOPICAL at 10:55

## 2019-01-11 RX ADMIN — DIPHENHYDRAMINE HYDROCHLORIDE, ZINC ACETATE SCH APPLIC: 2; .1 CREAM TOPICAL at 16:42

## 2019-01-11 RX ADMIN — Medication SCH ML: at 09:13

## 2019-01-11 RX ADMIN — ONDANSETRON PRN MG: 2 INJECTION INTRAMUSCULAR; INTRAVENOUS at 09:12

## 2019-01-11 NOTE — PDOC.PN
- Subjective


Encounter Start Date: 01/11/19


Encounter Start Time: 13:37


Subjective: nausea and poor po intake .no Abd pain.no appetite


-:  wants her to be kept comfortable


-: discussed w daughter over phone





- Objective


Resuscitation Status - Order Detail:





01/09/19 11:09


Resuscitation Status Routine 


   Resuscitation Status: DNAR: NO Resuscitation


   Discussed with: discussed w pt and  in detail.see ACP note








MAR Reviewed: Yes


Vital Signs & Weight: 


 Vital Signs (12 hours)











  Temp Pulse Resp BP Pulse Ox


 


 01/11/19 12:16  98.4 F  105 H  20  127/60  97


 


 01/11/19 10:30   82  16   96


 


 01/11/19 08:10      98


 


 01/11/19 07:39  98.3 F  97  16  110/57 L  98


 


 01/11/19 06:42   85  16   97


 


 01/11/19 03:45  99.1 F  88  19  100/53 L  93 L








 Weight











Admit Weight                   173 lb 6.4 oz


 


Weight                         172 lb 8 oz














I&O: 


 











 01/10/19 01/11/19 01/12/19





 06:59 06:59 06:59


 


Intake Total 965 1440 


 


Output Total 1300 400 


 


Balance -335 1040 











Result Diagrams: 


 01/10/19 04:45





 01/10/19 04:45


Additional Labs: 





Microbiology





01/03/19 13:08   Venous blood - Right Hand   Blood Culture - Final


                              NO GROWTH IN 5 DAYS


01/03/19 12:05   Nasal swab   Influenza Types A,B Direct EIA - Final


01/03/19 11:35   Venous blood - Left Hand   Blood Culture - Final


                              NO GROWTH IN 5 DAYS








Radiology Reviewed by me: Yes (KUB-no Ileus or SBO)





Phys Exam





- Physical Examination


Constitutional: NAD


lethargic


HEENT: PERRLA, moist MMs, sclera anicteric, oral pharynx no lesions


Neck: no nodes, no JVD, supple, full ROM


Respiratory: no wheezing, no rales, no rhonchi, clear to auscultation bilateral


Cardiovascular: RRR, no significant murmur, no rub


Gastrointestinal: soft, non-tender, no distention


poor bowel sounds


Musculoskeletal: no edema, pulses present


Neurological: non-focal, normal sensation, moves all 4 limbs


Deviation from normal: somnolent


Skin: no rash





Dx/Plan


(1) Acute on chronic diastolic CHF (congestive heart failure)


Code(s): I50.33 - ACUTE ON CHRONIC DIASTOLIC (CONGESTIVE) HEART FAILURE   Status

: Acute   Comment: EF 50-55%.  Patient unable to tolerate lisinopril --> 

developed hypotension.  Unknown if rash also possibly related.   





(2) Atrial fibrillation with RVR


Code(s): I48.91 - UNSPECIFIED ATRIAL FIBRILLATION   Status: Acute   Comment: BB 

and CCB was on hold due to Hypotension.Now restarted. will monitor BP.on 

Xarelto   





(3) Hydronephrosis, right


Code(s): N13.30 - UNSPECIFIED HYDRONEPHROSIS   Status: Acute   Comment: Likely 

chronic, no additional evaluation planned   





(4) Pneumonia


Code(s): J18.9 - PNEUMONIA, UNSPECIFIED ORGANISM   Status: Acute   Comment: 

Blood cultures negative.  New rash, consistent with drug rash, worried about 

levaquin or protonix as potential new meds.  Changed abx to doxycycline   





(5) Recurrent left pleural effusion


Code(s): J90 - PLEURAL EFFUSION, NOT ELSEWHERE CLASSIFIED   Status: Acute   





(6) Allergic drug rash


Code(s): L27.0 - GEN SKIN ERUPTION DUE TO DRUGS AND MEDS TAKEN INTERNALLY   

Status: Acute   





(7) Dementia


Code(s): F03.90 - UNSPECIFIED DEMENTIA WITHOUT BEHAVIORAL DISTURBANCE   Status: 

Chronic   





(8) Severe mitral regurgitation


Code(s): I34.0 - NONRHEUMATIC MITRAL (VALVE) INSUFFICIENCY   Status: Chronic   

Comment: Significant comorbidity   





(9) Severe tricuspid regurgitation


Code(s): I07.1 - RHEUMATIC TRICUSPID INSUFFICIENCY   Status: Chronic   





(10) Weakness generalized


Code(s): R53.1 - WEAKNESS   Status: Acute   





(11) Carotid stenosis, right


Code(s): I65.21 - OCCLUSION AND STENOSIS OF RIGHT CAROTID ARTERY   Status: 

Chronic   





(12) Chronic low back pain


Code(s): M54.5 - LOW BACK PAIN; G89.29 - OTHER CHRONIC PAIN   Status: Chronic   





(13) GERD (gastroesophageal reflux disease)


Code(s): K21.9 - GASTRO-ESOPHAGEAL REFLUX DISEASE WITHOUT ESOPHAGITIS   Status: 

Chronic   


Qualifiers: 


   Esophagitis presence: without esophagitis   Qualified Code(s): K21.9 - Gastro

-esophageal reflux disease without esophagitis   





- Plan


plan discussed w/ family, continue antibiotics, PT/OT, respiratory therapy, 

incentive spirometry, out of bed/ambulate, DVT proph w/SCDs


Add lo dose lasix.Metoprolol & cardiazem restarted.


-: Family leaning more towrds comfort care.will follow .PCT updated


-: Poor prognosis with severe diastolic CHF,severe deconditioning


-: trial of prn morphine for comfort if BP permits


-: lab holiday tomorrow





* .








Review of Systems





- Review of Systems


Musculoskeletal: Back Pain


Other: 





limited due to somnolence and pain





- Medications/Allergies


Allergies/Adverse Reactions: 


 Allergies











Allergy/AdvReac Type Severity Reaction Status Date / Time


 


No Known Drug Allergies Allergy   Verified 08/30/18 16:00











Medications: 


 Current Medications





Acetaminophen (Tylenol)  650 mg PO Q4H PRN


   PRN Reason: Headache/Fever/Mild Pain (1-3)


   Last Admin: 01/07/19 21:13 Dose:  650 mg


Acetaminophen (Tylenol)  650 mg [place] Q4H PRN


   PRN Reason: Headache/Fever/Mild Pain (1-3)


Hydrocodone Bitart/Acetaminophen (Norco 5/325)  1 tab PO Q4H PRN


   PRN Reason: Severe Pain (7-10)


   Last Admin: 01/10/19 14:18 Dose:  1 tab


Albuterol/Ipratropium (Duoneb)  3 ml NEB [id number] [facility]


   Last Admin: 01/11/19 10:30 Dose:  3 ml


Diltiazem HCl (Cardizem Cd)  180 mg PO DAILY [facility]


   Last Admin: 01/11/19 12:20 Dose:  180 mg


Diphenhydramine HCl (Benadryl)  25 mg PO Q6H PRN


   PRN Reason: Itching & Insomnia


   Last Admin: 01/10/19 19:59 Dose:  25 mg


Doxycycline Hyclate (Vibramycin)  100 mg PO BID [facility]


   Last Admin: 01/11/19 12:21 Dose:  100 mg


Furosemide (Lasix)  20 mg PO 0900,1400 [facility]


Gabapentin (Neurontin)  300 mg PO HS [facility]


   Last Admin: 01/10/19 19:59 Dose:  300 mg


Methocarbamol (Robaxin)  500 mg PO Q8HR [facility]


   Last Admin: 01/11/19 05:06 Dose:  Not Given


Metoprolol Tartrate (Lopressor)  12.5 mg PO BID [facility]


   Last Admin: 01/11/19 12:20 Dose:  12.5 mg


Miscellaneous Medication (Pharmacy To Dose)  1 each IVPB ONE PRN


   PRN Reason: Pharmacy to dose


   Stop: 01/13/19 14:32


Morphine Sulfate (Morphine)  2 mg SLOW IVP Q4H PRN


   PRN Reason: breakthrough Pain


Ondansetron HCl (Zofran)  4 mg SLOW IVP Q6H PRN


   PRN Reason: Nausea/Vomiting


   Last Admin: 01/11/19 09:12 Dose:  4 mg


Oxybutynin Chloride (Ditropan)  5 mg PO HS [facility]


   Last Admin: 01/10/19 20:00 Dose:  5 mg


Polyethylene Glycol (Miralax)  17 gm PO DAILY [facility]


   Last Admin: 01/11/19 12:19 Dose:  17 gm


Potassium Chloride (Klor-Con 10)  10 meq PO DAILY [facility]


   Last Admin: 01/11/19 12:19 Dose:  10 meq


Promethazine HCl (Phenergan)  12.5 mg IM Q2H PRN


   PRN Reason: Nausea/Vomiting


   Last Admin: 01/11/19 12:17 Dose:  12.5 mg


Rivaroxaban (Xarelto)  20 mg PO DAILY [facility]


   Last Admin: 01/11/19 12:19 Dose:  20 mg


Senna (Senokot)  1 tab PO BID PRN


   PRN Reason: Constipation


Senna/Docusate Sodium (Senokot S)  2 tab PO BID PRN


   PRN Reason: Constipation


Sodium Chloride (Flush - Normal Saline)  10 ml IVF Q12HR [facility]


   Last Admin: 01/11/19 09:13 Dose:  10 ml


Sodium Chloride (Flush - Normal Saline)  10 ml IVF PRN PRN


   PRN Reason: Saline Flush


Tramadol HCl (Ultram)  50 mg PO Q6H PRN


   PRN Reason: Moderate Pain (4-6)


   Last Admin: 01/10/19 09:28 Dose:  50 mg


Zinc Acetate/Diphenhydramine (Benadryl 2% Cream)  0 gm TOP TID [facility]


   Last Admin: 01/11/19 10:55 Dose:  1 applic


Zinc Sulfate (Zinc Sulfate)  220 mg PO DAILY [facility]


   Last Admin: 01/11/19 12:21 Dose:  220 mg

## 2019-01-11 NOTE — RAD
KUB:

 

Date: 1-11-19 

 

Provided Clinical History: 

Abdominal pain, nausea. 

 

FINDINGS: 

Abdominal bowel gas pattern is nonspecific. No radiographically apparent urinary tract calculi. Dorsa
l column stimulator device overlies the right pelvis. The supine nature of the examination is not sen
sitive for detection of pneumoperitoneum. 

 

IMPRESSION: 

Nonspecific bowel gas pattern. 

 

POS: EVARISTO

## 2019-01-12 RX ADMIN — Medication SCH: at 09:00

## 2019-01-12 RX ADMIN — Medication PRN ML: at 17:05

## 2019-01-12 RX ADMIN — Medication SCH ML: at 21:10

## 2019-01-12 RX ADMIN — DIPHENHYDRAMINE HYDROCHLORIDE, ZINC ACETATE SCH: 2; .1 CREAM TOPICAL at 09:00

## 2019-01-12 RX ADMIN — DIPHENHYDRAMINE HYDROCHLORIDE, ZINC ACETATE SCH: 2; .1 CREAM TOPICAL at 21:11

## 2019-01-12 RX ADMIN — DIPHENHYDRAMINE HYDROCHLORIDE, ZINC ACETATE SCH: 2; .1 CREAM TOPICAL at 14:32

## 2019-01-12 NOTE — PDOC.PN
- Subjective


Encounter Start Date: 01/12/19


Encounter Start Time: 13:02


Subjective: more comfortable last night after receiving morphine


-: minimal PO intake.minimally responsive


-: family express wishes for DC with hospice & to discuss w Hospice





- Objective


Resuscitation Status - Order Detail:





01/09/19 11:09


Resuscitation Status Routine 


   Resuscitation Status: DNAR: NO Resuscitation


   Discussed with: discussed w pt and  in detail.see ACP note








MAR Reviewed: Yes


Vital Signs & Weight: 


 Vital Signs (12 hours)











  Temp Pulse Resp BP Pulse Ox


 


 01/12/19 09:47   72  16   98


 


 01/12/19 09:27  99.0 F  83  18  100/50 L  98


 


 01/12/19 06:32   78  16   98


 


 01/12/19 03:21  98.4 F  95  16  95/51 L  100


 


 01/12/19 02:28   89  16   98








 Weight











Admit Weight                   173 lb 6.4 oz


 


Weight                         166 lb 11.2 oz














I&O: 


 











 01/11/19 01/12/19 01/13/19





 06:59 06:59 06:59


 


Intake Total 1440 720 


 


Output Total 400 2100 


 


Balance 1040 -1380 











Result Diagrams: 


 01/10/19 04:45





 01/10/19 04:45


Additional Labs: 





Microbiology





01/04/19 Unknown   Stool   Stool Occult Blood (ANDREIA) - Final


01/04/19 17:18   Urine Straight Catheter   Urine Culture - Final


                              NO GROWTH AT 48 HOURS


01/03/19 13:08   Venous blood - Right Hand   Blood Culture - Final


                              NO GROWTH IN 5 DAYS


01/03/19 12:05   Nasal swab   Influenza Types A,B Direct EIA - Final


01/03/19 11:35   Venous blood - Left Hand   Blood Culture - Final


                              NO GROWTH IN 5 DAYS











Phys Exam





- Physical Examination


Constitutional: NAD


sleeping comfortable. responds to verbal stimuli


lethargic


HEENT: PERRLA, moist MMs, sclera anicteric, oral pharynx no lesions


Neck: no nodes, no JVD, supple, full ROM


Respiratory: no wheezing, no rales, no rhonchi, clear to auscultation bilateral


Cardiovascular: RRR, no significant murmur


Gastrointestinal: soft, non-tender, no distention, positive bowel sounds


Musculoskeletal: no edema, pulses present


Neurological: moves all 4 limbs


Psychiatric: normal affect


Deviation from normal: somnolent and minimally responsive


Skin: no rash





Dx/Plan


(1) Acute on chronic diastolic CHF (congestive heart failure)


Code(s): I50.33 - ACUTE ON CHRONIC DIASTOLIC (CONGESTIVE) HEART FAILURE   Status

: Acute   Comment: EF 50-55%.  Patient unable to tolerate lisinopril --> 

developed hypotension.  Unknown if rash also possibly related.   





(2) Atrial fibrillation with RVR


Code(s): I48.91 - UNSPECIFIED ATRIAL FIBRILLATION   Status: Acute   Comment: BB 

and CCB was on hold due to Hypotension.Now restarted. will monitor BP.on 

Xarelto   





(3) Hydronephrosis, right


Code(s): N13.30 - UNSPECIFIED HYDRONEPHROSIS   Status: Acute   Comment: Likely 

chronic, no additional evaluation planned   





(4) Pneumonia


Code(s): J18.9 - PNEUMONIA, UNSPECIFIED ORGANISM   Status: Acute   Comment: 

Blood cultures negative.  New rash, consistent with drug rash, worried about 

levaquin or protonix as potential new meds.  Changed abx to doxycycline   





(5) Recurrent left pleural effusion


Code(s): J90 - PLEURAL EFFUSION, NOT ELSEWHERE CLASSIFIED   Status: Acute   





(6) Allergic drug rash


Code(s): L27.0 - GEN SKIN ERUPTION DUE TO DRUGS AND MEDS TAKEN INTERNALLY   

Status: Acute   





(7) Dementia


Code(s): F03.90 - UNSPECIFIED DEMENTIA WITHOUT BEHAVIORAL DISTURBANCE   Status: 

Chronic   





(8) Severe mitral regurgitation


Code(s): I34.0 - NONRHEUMATIC MITRAL (VALVE) INSUFFICIENCY   Status: Chronic   

Comment: Significant comorbidity   





(9) Severe tricuspid regurgitation


Code(s): I07.1 - RHEUMATIC TRICUSPID INSUFFICIENCY   Status: Chronic   





(10) Weakness generalized


Code(s): R53.1 - WEAKNESS   Status: Acute   





(11) Carotid stenosis, right


Code(s): I65.21 - OCCLUSION AND STENOSIS OF RIGHT CAROTID ARTERY   Status: 

Chronic   





(12) Chronic low back pain


Code(s): M54.5 - LOW BACK PAIN; G89.29 - OTHER CHRONIC PAIN   Status: Chronic   





(13) GERD (gastroesophageal reflux disease)


Code(s): K21.9 - GASTRO-ESOPHAGEAL REFLUX DISEASE WITHOUT ESOPHAGITIS   Status: 

Chronic   


Qualifiers: 


   Esophagitis presence: without esophagitis   Qualified Code(s): K21.9 - Gastro

-esophageal reflux disease without esophagitis   





- Plan


respiratory therapy, incentive spirometry, DVT proph w/SCDs


family requesting DC w hospice .will coordinate


-: had a detailed discussion with  and daughter


-: Charge Nurse and CM updated-appreciate help


-: cont supportive care and prn morphine


-: BP lower side but stable.





* .








Review of Systems





- Review of Systems


Musculoskeletal: Back Pain


Other: 





limited due to somnolence





- Medications/Allergies


Allergies/Adverse Reactions: 


 Allergies











Allergy/AdvReac Type Severity Reaction Status Date / Time


 


No Known Drug Allergies Allergy   Verified 08/30/18 16:00











Medications: 


 Current Medications





Acetaminophen (Tylenol)  650 mg PO Q4H PRN


   PRN Reason: Headache/Fever/Mild Pain (1-3)


   Last Admin: 01/07/19 21:13 Dose:  650 mg


Acetaminophen (Tylenol)  650 mg [place] Q4H PRN


   PRN Reason: Headache/Fever/Mild Pain (1-3)


Hydrocodone Bitart/Acetaminophen (Norco 5/325)  1 tab PO Q4H PRN


   PRN Reason: Severe Pain (7-10)


   Last Admin: 01/10/19 14:18 Dose:  1 tab


Albuterol/Ipratropium (Duoneb)  3 ml NEB [id number] [facility]


   Last Admin: 01/12/19 09:47 Dose:  3 ml


Diltiazem HCl (Cardizem Cd)  180 mg PO DAILY [facility]


   Last Admin: 01/12/19 09:00 Dose:  Not Given


Diphenhydramine HCl (Benadryl)  25 mg PO Q6H PRN


   PRN Reason: Itching & Insomnia


   Last Admin: 01/10/19 19:59 Dose:  25 mg


Doxycycline Hyclate (Vibramycin)  100 mg PO BID [facility]


   Last Admin: 01/12/19 09:00 Dose:  Not Given


Furosemide (Lasix)  20 mg PO 0900,1400 [facility]


   Last Admin: 01/12/19 09:00 Dose:  Not Given


Gabapentin (Neurontin)  300 mg PO HS [facility]


   Last Admin: 01/11/19 21:14 Dose:  300 mg


Methocarbamol (Robaxin)  500 mg PO Q8HR [facility]


   Last Admin: 01/12/19 08:00 Dose:  Not Given


Metoprolol Tartrate (Lopressor)  12.5 mg PO BID [facility]


   Last Admin: 01/12/19 09:00 Dose:  Not Given


Miscellaneous Medication (Pharmacy To Dose)  1 each IVPB ONE PRN


   PRN Reason: Pharmacy to dose


   Stop: 01/13/19 14:32


Morphine Sulfate (Morphine)  2 mg SLOW IVP Q4H PRN


   PRN Reason: breakthrough Pain


   Last Admin: 01/12/19 04:30 Dose:  2 mg


Ondansetron HCl (Zofran)  4 mg SLOW IVP Q6H PRN


   PRN Reason: Nausea/Vomiting


   Last Admin: 01/11/19 09:12 Dose:  4 mg


Oxybutynin Chloride (Ditropan)  5 mg PO HS [facility]


   Last Admin: 01/11/19 21:14 Dose:  5 mg


Polyethylene Glycol (Miralax)  17 gm PO DAILY [facility]


   Last Admin: 01/12/19 09:00 Dose:  Not Given


Potassium Chloride (Klor-Con 10)  10 meq PO DAILY [facility]


   Last Admin: 01/12/19 09:00 Dose:  Not Given


Promethazine HCl (Phenergan)  12.5 mg IM Q2H PRN


   PRN Reason: Nausea/Vomiting


   Last Admin: 01/11/19 16:42 Dose:  12.5 mg


Rivaroxaban (Xarelto)  20 mg PO DAILY [facility]


   Last Admin: 01/12/19 09:00 Dose:  Not Given


Senna (Senokot)  1 tab PO BID PRN


   PRN Reason: Constipation


Senna/Docusate Sodium (Senokot S)  2 tab PO BID PRN


   PRN Reason: Constipation


Sodium Chloride (Flush - Normal Saline)  10 ml IVF Q12HR [facility]


   Last Admin: 01/12/19 09:00 Dose:  Not Given


Sodium Chloride (Flush - Normal Saline)  10 ml IVF PRN PRN


   PRN Reason: Saline Flush


Tramadol HCl (Ultram)  50 mg PO Q6H PRN


   PRN Reason: Moderate Pain (4-6)


   Last Admin: 01/10/19 09:28 Dose:  50 mg


Zinc Acetate/Diphenhydramine (Benadryl 2% Cream)  0 gm TOP TID [facility]


   Last Admin: 01/12/19 09:00 Dose:  Not Given


Zinc Sulfate (Zinc Sulfate)  220 mg PO DAILY [facility]


   Last Admin: 01/12/19 09:00 Dose:  Not Given

## 2019-01-12 NOTE — EKG
Test Reason : SOB

Blood Pressure : ***/*** mmHG

Vent. Rate : 083 BPM     Atrial Rate : 000 BPM

   P-R Int : 000 ms          QRS Dur : 064 ms

    QT Int : 370 ms       P-R-T Axes : 000 023 002 degrees

   QTc Int : 434 ms

 

Atrial fibrillation

Low voltage QRS

 

 

Confirmed by KELTON FRANKLIN [id number]),  RUTH CARVALHO ([id number]) on 1/12/2019 8:15:24 PM

 

Referred By:             Confirmed By:KELTON FRANKLIN

## 2019-01-13 RX ADMIN — HYDROCODONE BITARTRATE AND ACETAMINOPHEN PRN TAB: 5; 325 TABLET ORAL at 17:32

## 2019-01-13 RX ADMIN — DIPHENHYDRAMINE HYDROCHLORIDE, ZINC ACETATE SCH APPLIC: 2; .1 CREAM TOPICAL at 20:19

## 2019-01-13 RX ADMIN — Medication SCH ML: at 20:20

## 2019-01-13 RX ADMIN — DIPHENHYDRAMINE HYDROCHLORIDE, ZINC ACETATE SCH: 2; .1 CREAM TOPICAL at 15:16

## 2019-01-13 RX ADMIN — Medication SCH ML: at 09:47

## 2019-01-13 RX ADMIN — DIPHENHYDRAMINE HYDROCHLORIDE, ZINC ACETATE SCH APPLIC: 2; .1 CREAM TOPICAL at 09:51

## 2019-01-13 RX ADMIN — Medication PRN ML: at 00:16

## 2019-01-13 NOTE — PDOC.PN
- Subjective


Encounter Start Date: 01/13/19


Encounter Start Time: 12:48


Subjective: c/o back pain,otherwise not much conversant due to somnolence


-: no overnight events





- Objective


Resuscitation Status - Order Detail:





01/09/19 11:09


Resuscitation Status Routine 


   Resuscitation Status: DNAR: NO Resuscitation


   Discussed with: discussed w pt and  in detail.see ACP note








MAR Reviewed: Yes


Vital Signs & Weight: 


 Vital Signs (12 hours)











  Temp Pulse Resp BP Pulse Ox


 


 01/13/19 08:30  98.9 F  91  16  121/57 L  99


 


 01/13/19 05:30  97.8 F  80  20  113/57 L  99








 Weight











Admit Weight                   173 lb 6.4 oz


 


Weight                         163 lb 11.2 oz














I&O: 


 











 01/12/19 01/13/19 01/14/19





 06:59 06:59 06:59


 


Intake Total 720 200 


 


Output Total 2100 900 


 


Balance -1380 -700 











Result Diagrams: 


 01/10/19 04:45





 01/10/19 04:45


Additional Labs: 





Microbiology





01/04/19 Unknown   Stool   Stool Occult Blood (ANDREIA) - Final


01/04/19 17:18   Urine Straight Catheter   Urine Culture - Final


                              NO GROWTH AT 48 HOURS


01/03/19 13:08   Venous blood - Right Hand   Blood Culture - Final


                              NO GROWTH IN 5 DAYS


01/03/19 12:05   Nasal swab   Influenza Types A,B Direct EIA - Final


01/03/19 11:35   Venous blood - Left Hand   Blood Culture - Final


                              NO GROWTH IN 5 DAYS











Phys Exam





- Physical Examination


Constitutional: NAD


gave water by glass & she drank it herself


HEENT: PERRLA, moist MMs, sclera anicteric, oral pharynx no lesions


Neck: no nodes, no JVD, supple, full ROM


Respiratory: no wheezing, no rales, no rhonchi, clear to auscultation bilateral


Cardiovascular: RRR, no significant murmur


Gastrointestinal: soft, non-tender, no distention, positive bowel sounds


Musculoskeletal: no edema, pulses present


Neurological: moves all 4 limbs





Dx/Plan


(1) Acute on chronic diastolic CHF (congestive heart failure)


Code(s): I50.33 - ACUTE ON CHRONIC DIASTOLIC (CONGESTIVE) HEART FAILURE   Status

: Acute   Comment: EF 50-55%.  Patient unable to tolerate lisinopril --> 

developed hypotension.  Unknown if rash also possibly related.on low dose lasix

   





(2) Atrial fibrillation with RVR


Code(s): I48.91 - UNSPECIFIED ATRIAL FIBRILLATION   Status: Acute   Comment: BB 

and CCB was on hold due to Hypotension.Now restarted. will monitor BP.on 

Xarelto   





(3) Hydronephrosis, right


Code(s): N13.30 - UNSPECIFIED HYDRONEPHROSIS   Status: Acute   Comment: Likely 

chronic, no additional evaluation planned   





(4) Pneumonia


Code(s): J18.9 - PNEUMONIA, UNSPECIFIED ORGANISM   Status: Acute   Comment: 

Blood cultures negative.  New rash, consistent with drug rash, worried about 

levaquin or protonix as potential new meds.  Changed abx to doxycycline   





(5) Recurrent left pleural effusion


Code(s): J90 - PLEURAL EFFUSION, NOT ELSEWHERE CLASSIFIED   Status: Acute   





(6) Allergic drug rash


Code(s): L27.0 - GEN SKIN ERUPTION DUE TO DRUGS AND MEDS TAKEN INTERNALLY   

Status: Acute   





(7) Dementia


Code(s): F03.90 - UNSPECIFIED DEMENTIA WITHOUT BEHAVIORAL DISTURBANCE   Status: 

Chronic   





(8) Severe mitral regurgitation


Code(s): I34.0 - NONRHEUMATIC MITRAL (VALVE) INSUFFICIENCY   Status: Chronic   

Comment: Significant comorbidity   





(9) Severe tricuspid regurgitation


Code(s): I07.1 - RHEUMATIC TRICUSPID INSUFFICIENCY   Status: Chronic   





(10) Weakness generalized


Code(s): R53.1 - WEAKNESS   Status: Acute   





(11) Carotid stenosis, right


Code(s): I65.21 - OCCLUSION AND STENOSIS OF RIGHT CAROTID ARTERY   Status: 

Chronic   





(12) Chronic low back pain


Code(s): M54.5 - LOW BACK PAIN; G89.29 - OTHER CHRONIC PAIN   Status: Chronic   





(13) GERD (gastroesophageal reflux disease)


Code(s): K21.9 - GASTRO-ESOPHAGEAL REFLUX DISEASE WITHOUT ESOPHAGITIS   Status: 

Chronic   


Qualifiers: 


   Esophagitis presence: without esophagitis   Qualified Code(s): K21.9 - Gastro

-esophageal reflux disease without esophagitis   





- Plan


respiratory therapy, incentive spirometry, DVT proph w/SCDs


awaiting final DC disposition.Home Vs NH w hospice


-: cont gentle diuresis.on BB and lisinopril


-: stop doxycycline as no clear benefit


-: DC when hospice finalized


-: HD stable.MS contin on hold d/t low BP





* .








Review of Systems





- Review of Systems


Other: 





can not be obtained due to lethargy and reduced responsiveness





- Medications/Allergies


Allergies/Adverse Reactions: 


 Allergies











Allergy/AdvReac Type Severity Reaction Status Date / Time


 


No Known Drug Allergies Allergy   Verified 08/30/18 16:00











Medications: 


 Current Medications





Acetaminophen (Tylenol)  650 mg PO Q4H PRN


   PRN Reason: Headache/Fever/Mild Pain (1-3)


   Last Admin: 01/07/19 21:13 Dose:  650 mg


Acetaminophen (Tylenol)  650 mg OK Q4H PRN


   PRN Reason: Headache/Fever/Mild Pain (1-3)


Hydrocodone Bitart/Acetaminophen (Norco 5/325)  1 tab PO Q4H PRN


   PRN Reason: Severe Pain (7-10)


   Last Admin: 01/10/19 14:18 Dose:  1 tab


Albuterol/Ipratropium (Duoneb)  3 ml NEB [id number] PRN


   PRN Reason: SOB &/or Wheezing


Diltiazem HCl (Cardizem Cd)  180 mg PO DAILY [facility]


   Last Admin: 01/13/19 09:46 Dose:  180 mg


Diphenhydramine HCl (Benadryl)  25 mg PO Q6H PRN


   PRN Reason: Itching & Insomnia


   Last Admin: 01/10/19 19:59 Dose:  25 mg


Doxycycline Hyclate (Vibramycin)  100 mg PO BID [facility]


   Last Admin: 01/13/19 09:45 Dose:  100 mg


Furosemide (Lasix)  20 mg PO 0900,1400 [facility]


   Last Admin: 01/13/19 09:46 Dose:  20 mg


Gabapentin (Neurontin)  300 mg PO HS [facility]


   Last Admin: 01/12/19 21:10 Dose:  300 mg


Methocarbamol (Robaxin)  500 mg PO Q8HR [facility]


   Last Admin: 01/13/19 05:33 Dose:  500 mg


Metoprolol Tartrate (Lopressor)  12.5 mg PO BID [facility]


   Last Admin: 01/13/19 09:45 Dose:  12.5 mg


Miscellaneous Medication (Pharmacy To Dose)  1 each IVPB ONE PRN


   PRN Reason: Pharmacy to dose


   Stop: 01/13/19 14:32


Morphine Sulfate (Morphine)  2 mg SLOW IVP Q4H PRN


   PRN Reason: breakthrough Pain


   Last Admin: 01/13/19 09:58 Dose:  2 mg


Ondansetron HCl (Zofran)  4 mg SLOW IVP Q6H PRN


   PRN Reason: Nausea/Vomiting


   Last Admin: 01/11/19 09:12 Dose:  4 mg


Oxybutynin Chloride (Ditropan)  5 mg PO HS [facility]


   Last Admin: 01/12/19 21:10 Dose:  5 mg


Polyethylene Glycol (Miralax)  17 gm PO DAILY [facility]


   Last Admin: 01/13/19 09:46 Dose:  17 gm


Potassium Chloride (Klor-Con 10)  10 meq PO DAILY [facility]


   Last Admin: 01/13/19 09:45 Dose:  10 meq


Promethazine HCl (Phenergan)  12.5 mg IM Q2H PRN


   PRN Reason: Nausea/Vomiting


   Last Admin: 01/11/19 16:42 Dose:  12.5 mg


Rivaroxaban (Xarelto)  20 mg PO DAILY [facility]


   Last Admin: 01/12/19 09:00 Dose:  Not Given


Senna (Senokot)  1 tab PO BID PRN


   PRN Reason: Constipation


Senna/Docusate Sodium (Senokot S)  2 tab PO BID PRN


   PRN Reason: Constipation


Sodium Chloride (Flush - Normal Saline)  10 ml IVF Q12HR [facility]


   Last Admin: 01/13/19 09:47 Dose:  10 ml


Sodium Chloride (Flush - Normal Saline)  10 ml IVF PRN PRN


   PRN Reason: Saline Flush


   Last Admin: 01/13/19 00:16 Dose:  10 ml


Tramadol HCl (Ultram)  50 mg PO Q6H PRN


   PRN Reason: Moderate Pain (4-6)


   Last Admin: 01/10/19 09:28 Dose:  50 mg


Zinc Acetate/Diphenhydramine (Benadryl 2% Cream)  0 gm TOP TID [facility]


   Last Admin: 01/13/19 09:51 Dose:  1 applic


Zinc Sulfate (Zinc Sulfate)  220 mg PO DAILY [facility]


   Last Admin: 01/13/19 10:01 Dose:  Not Given

## 2019-01-14 VITALS — SYSTOLIC BLOOD PRESSURE: 118 MMHG | DIASTOLIC BLOOD PRESSURE: 62 MMHG | TEMPERATURE: 97.2 F

## 2019-01-14 RX ADMIN — DIPHENHYDRAMINE HYDROCHLORIDE, ZINC ACETATE SCH APPLIC: 2; .1 CREAM TOPICAL at 09:00

## 2019-01-14 RX ADMIN — HYDROCODONE BITARTRATE AND ACETAMINOPHEN PRN TAB: 5; 325 TABLET ORAL at 12:10

## 2019-01-14 RX ADMIN — Medication SCH ML: at 09:01

## 2019-01-14 NOTE — PDOC.PN
- Subjective


Encounter Start Date: 01/14/19


Encounter Start Time: 08:13


Subjective: Seen and examined -no new complaint





- Objective


Resuscitation Status - Order Detail:





01/09/19 11:09


Resuscitation Status Routine 


   Resuscitation Status: DNAR: NO Resuscitation


   Discussed with: discussed w pt and  in detail.see ACP note








Vital Signs & Weight: 


 Vital Signs (12 hours)











  Temp Pulse Resp BP Pulse Ox


 


 01/14/19 03:50  97.9 F  70  18  103/62  93 L


 


 01/13/19 20:15  97.8 F  85  16  126/69  98








 Weight











Admit Weight                   173 lb 6.4 oz


 


Weight                         165 lb














I&O: 


 











 01/13/19 01/14/19 01/15/19





 06:59 06:59 06:59


 


Intake Total 200 777 


 


Output Total 900 560 


 


Balance -700 217 











Result Diagrams: 


 01/10/19 04:45





 01/10/19 04:45





Phys Exam





- Physical Examination


Constitutional: NAD


HEENT: PERRLA, moist MMs, sclera anicteric


Neck: no nodes, no JVD, supple, full ROM


Respiratory: no wheezing, no rales, no rhonchi, clear to auscultation bilateral


Cardiovascular: RRR, no significant murmur


Gastrointestinal: soft, non-tender, no distention


Musculoskeletal: pulses present





Dx/Plan


(1) Acute on chronic diastolic CHF (congestive heart failure)


Code(s): I50.33 - ACUTE ON CHRONIC DIASTOLIC (CONGESTIVE) HEART FAILURE   Status

: Acute   Comment: EF 50-55%.  Patient unable to tolerate lisinopril --> 

developed hypotension.  Unknown if rash also possibly related.on low dose lasix

   





(2) Allergic drug rash


Code(s): L27.0 - GEN SKIN ERUPTION DUE TO DRUGS AND MEDS TAKEN INTERNALLY   

Status: Acute   





(3) Atrial fibrillation with RVR


Code(s): I48.91 - UNSPECIFIED ATRIAL FIBRILLATION   Status: Acute   Comment: BB 

and CCB was on hold due to Hypotension.Now restarted. will monitor BP.on 

Xarelto   





(4) Hydronephrosis, right


Code(s): N13.30 - UNSPECIFIED HYDRONEPHROSIS   Status: Acute   Comment: Likely 

chronic, no additional evaluation planned   





(5) Pneumonia


Code(s): J18.9 - PNEUMONIA, UNSPECIFIED ORGANISM   Status: Acute   Comment: 

Blood cultures negative.  New rash, consistent with drug rash, worried about 

levaquin or protonix as potential new meds.  Changed abx to doxycycline   





(6) Recurrent left pleural effusion


Code(s): J90 - PLEURAL EFFUSION, NOT ELSEWHERE CLASSIFIED   Status: Acute   





(7) Dementia


Code(s): F03.90 - UNSPECIFIED DEMENTIA WITHOUT BEHAVIORAL DISTURBANCE   Status: 

Chronic   





(8) Severe mitral regurgitation


Code(s): I34.0 - NONRHEUMATIC MITRAL (VALVE) INSUFFICIENCY   Status: Chronic   

Comment: Significant comorbidity   





(9) Weakness generalized


Code(s): R53.1 - WEAKNESS   Status: Acute   





(10) Chronic low back pain


Code(s): M54.5 - LOW BACK PAIN; G89.29 - OTHER CHRONIC PAIN   Status: Chronic   





(11) Hyponatremia


Code(s): E87.1 - HYPO-OSMOLALITY AND HYPONATREMIA   Status: Chronic   





- Plan


plan discussed w/ family, 


Awaiting finalisation of Hospice plan-NH vs Home [facility] hospice





* .

## 2019-01-15 NOTE — DIS
DATE OF ADMISSION:  01/03/2019



DATE OF DISCHARGE:  01/14/2019



For details of the history and physical and consultative notes, please refer to

dictations and records. 



SUMMARY:  An 86-year-old female patient who presented here from the nursing facility

with a history of pneumonia, noted to have hematuria for which Urology saw the

patient.  The patient was placed on antibiotics to address the sepsis and

pneumonitis.  Urology evaluated this patient and was noted with a right

hydronephrosis.  However, given the comorbidities and the frail nature of this

patient, decision was taken not to do any further aggressive intervention.  The

patient continued on medical treatment with antibiotics, which sustained clinical

improvement.  After the family's discussion, decision was taken to have the

palliative care team to evaluate this patient and the patient was found not to be a

candidate for inpatient hospice at this point.  Therefore palliative care team will

follow up with this patient, was appropriate for inpatient hospice level care.

Therefore, decision was taken to have this patient go home with hospice, having been

accepted by compassionate care.  All arrangement finalized for the patient to go

home under supervision of compassionate care.  A decision was then taken to

discharge this patient on the following medications; 

1. Ascorbic acid 500 mg p.o. b.i.d.

2. Tums q.i.d. p.r.n.

3. Cipro 250 mg every 12.

4. Neurontin 300 mg p.o. at bedtime.

5. Hydrocortisone cream 2.5%.

6. DuoNebs q.4 p.r.n.

7. Robaxin 500 mg p.o. q.8.

8. Metoclopramide 5 mg p.o. daily.

9. MS Contin 30 mg p.o. q.8 p.r.n.

10. Omeprazole 20 mg p.o. daily.

11. Zofran 4 mg q.6 p.r.n.

12. Ditropan 5 mg p.o. at bedtime.

13. MiraLAX 17 g p.o. daily.

14. Potassium 10 mEq p.o. daily.

15. Pro-Stat 30 mL p.o. b.i.d.

16. Xarelto 20 mg p.o. daily.

17. Senokot 8.6 mg p.o. b.i.d. p.r.n.

18. Tramadol 50 mg p.o. q.6 p.r.n.

19. Zinc sulfate 220 mg p.o. daily.

20. Aspirin 81 mg daily.

21. Lipitor 20 mg p.o. at bedtime.

22. Diltiazem 180 mg p.o. daily.

23. Furosemide 20 mg p.o. b.i.d.

24. Metoprolol 50 mg p.o. b.i.d.



DISCHARGE INSTRUCTIONS:  Will be based on the recommendations of the compassionate

care. 



Total time spent including face-to-face encounter 32 minutes.







Job ID:  [id number]

## 2025-06-27 NOTE — CT
CT OF BRAIN PERFORMED WITHOUT CONTRAST ENHANCEMENT:

1/3/18

 

HISTORY: 

Altered mental status. 

 

COMPARISON:  

12/19/17 study. 

 

Ventricular and cisternal system is within normal limits. There is no signs of intracerebral hemorrha
ge or extra-axial fluid collections. The mastoid air cells and visualized sinuses are clear. 

 

IMPRESSION:  

No acute intracranial abnormalities.

 

POS: [facility]
(2) well flexed